# Patient Record
Sex: MALE | Race: WHITE | URBAN - METROPOLITAN AREA
[De-identification: names, ages, dates, MRNs, and addresses within clinical notes are randomized per-mention and may not be internally consistent; named-entity substitution may affect disease eponyms.]

---

## 2017-09-27 ENCOUNTER — HOSPITAL ENCOUNTER (OUTPATIENT)
Dept: CT IMAGING | Age: 62
Discharge: HOME | End: 2017-09-27
Attending: UROLOGY | Admitting: UROLOGY

## 2017-09-27 DIAGNOSIS — N39.0 URINARY TRACT INFECTION: ICD-10-CM

## 2017-09-27 DIAGNOSIS — N39.0 URINARY TRACT INFECTION, SITE NOT SPECIFIED: ICD-10-CM

## 2017-09-27 LAB
GFR AFRICAN AMERICAN: >60
GFR NON-AFRICAN AMERICAN: >60
PERFORMED ON: NORMAL
POC CREATININE: 0.9 MG/DL (ref 0.8–1.3)
POC SAMPLE TYPE: NORMAL

## 2022-11-29 ENCOUNTER — OFFICE VISIT (OUTPATIENT)
Dept: ORTHOPEDIC SURGERY | Age: 67
End: 2022-11-29

## 2022-11-29 VITALS — WEIGHT: 307 LBS | HEIGHT: 66 IN | BODY MASS INDEX: 49.34 KG/M2

## 2022-11-29 DIAGNOSIS — M16.12 PRIMARY OSTEOARTHRITIS OF LEFT HIP: Primary | ICD-10-CM

## 2022-11-29 DIAGNOSIS — M25.552 HIP PAIN, LEFT: ICD-10-CM

## 2022-11-29 RX ORDER — TIZANIDINE 4 MG/1
4 TABLET ORAL NIGHTLY PRN
COMMUNITY
Start: 2022-09-21

## 2022-11-29 RX ORDER — ATORVASTATIN CALCIUM 20 MG/1
TABLET, FILM COATED ORAL
COMMUNITY
Start: 2022-10-01

## 2022-11-29 RX ORDER — LISINOPRIL AND HYDROCHLOROTHIAZIDE 20; 12.5 MG/1; MG/1
TABLET ORAL
COMMUNITY
Start: 2022-10-01

## 2022-11-29 NOTE — PROGRESS NOTES
Frankey Moores  [id number]  November 29, 2022    Chief Complaint   Patient presents with    Hip Pain     Left       History: The patient is a 70-year-old gentleman who is here for evaluation of his left hip. He has had left hip pain and stiffness for approximately 1 year. He denies any specific injury. He does have significant pain at nighttime. He has difficulty getting up from a seated position. He feels that the left hip issues are affecting his back as well. He tried to golf this past fall, but was unable to golf due to the pain. He does have hypertension. He has tried ibuprofen without much relief. This is a consult from Poncho Palma MD for left hip pain. The patient's  past medical history, medications, allergies,  family history, social history, and have been reviewed, and dated and are recorded in the chart. Pertinent items are noted in HPI. Review of systems reviewed from Pertinent History Form dated on 11/29 and available in the patient's chart under the Media tab. Vitals:  Ht 5' 6\" (1.676 m)   Wt (!) 307 lb (139.3 kg)   BMI 49.55 kg/m²     Physical: Physical: Mr. Frankey Moores appears well, he is in no apparent distress, he demonstrates appropriate mood & affect. He is alert and oriented to person, place and time. He has  severe pain with internal rotation of the left hip. Range of motion of the left hip is : 40 degrees abduction, 30 degrees adduction, 35 degrees of external rotation and -5 degrees of internal rotation. Range of motion of the opposite hip is full. He is non tender laterally about the hips. Trendelenburg test is negative bilaterally. Christophe Yumiko test is negative bilaterally. He is non tender about the Sacroiliac joint bilaterally. Leg length discrepancy: none. Examination of the skin reveals no rashes, ulcerations, or lesions, bilaterally in the lower extremities. Sensation to both lower extremities is grossly intact.   Exam of both feet reveals pedal pulses intact and brisk cap refill. Patient is able to dorsiflex and wiggle all toes. Deep tendon reflexes of the lower extremities are normal and symmetric. He is non tender to palpation of the lumbar spine. X-rays: AP pelvis and 2 views of the left hip were obtained and demonstrate severe osteoarthritis of the left hip. There are periarticular osteophytes and complete loss of the joint space. There is also rather severe osteoarthritis of the right hip. Impression: left Hip Osteoarthritis     Plan: At this time, the patient will be scheduled for a left total hip arthroplasty. All risks including but not limited to blood loss, infection, persistent pain,stiffness, dislocation, weakness,loosening, leg length discrepancy, deep vein thrombosis,neurovascular injury, and the risks of anesthesia were discussed. The patient understands all risks and benefits of the procedure and agrees to proceed. The patient will see his primary care Pee Yarbrough MD, for medical clearance. If the patient is on NSAIDS or blood thinners these medications will need to be discontinued one week prior to surgery. Will plan on 81mg ASA BID for 35 days post-op. Orders Placed This Encounter   Procedures    XR HIP 2-3 VW W PELVIS LEFT     Rm 22.      Standing Status:   Future     Number of Occurrences:   1     Standing Expiration Date:   12/29/2022     Order Specific Question:   Reason for exam:     Answer:   pain

## 2022-11-29 NOTE — LETTER
[facility]: [phone] F: [phone]  Surgery Scheduling Form:  DEMOGRAPHICS:                                                                                                              .    Patient Name:  Obi Jeff    Patient :  1955   Patient SS#:        Patient Phone:  [phone] (home)      Patient Address:  [address] [place] [address]    Insurance:    Payer/Plan Subscr  Sex Relation Sub. Ins. ID Effective Group Num   1. MEDICARE - Hardeep Joyner 1955 Male Self [id number]                                    [address]    2. [employer] OF Mary Caicedo 1955 Male Self [id number]                                    [address] [employer] [employer] [address]     DIAGNOSIS & PROCEDURE:                                                                                            .    Diagnosis:  Osteoarthritis- left hip M16.12    Operation:  Total hip replacement- left hip 83767    Location:  [facility]    Surgeon:  El Faith    SCHEDULING INFORMATION:                                                                                         .    Requested Date:  23 OR Time: 7:30 am  Patient Arrival Time: 6:00 am     OR Time Required:  105  Minutes     1 hour 45 minutes    Anesthesia:  General    SA Required:  Yes x 2    Equipment:  Depuy Advanced    Status:  Same day admit     PAT Required:  Yes  COVID19 test:  N/A    Latex Allergy:  no Defibrilator or Pacemaker:  no    Isolation Precautions:  no                      Jitendra Stevenson MD     22   BILLING INFORMATION:                                                                                                    .                          CPT Code Modifier  Total hip  Prior auth: pending   Name: Obi Jeff  : 1955  Procedure: Total hip replacement- left         Date of Surgery:23              Date of JET:23    Allergies: Patient has no known allergies.     Ht Readings from Last 1 Encounters:   11/29/22 5' 6\" (1.676 m)      Wt Readings from Last 1 Encounters:   11/29/22 (!) 307 lb (139.3 kg)       TOTAL HIP REPLACEMENT PHYSICIANS ORDERS   I hereby authorize the pharmacy, under the formulary system to dispense a different brand of drug identical composition and comparable quality unless the brand name I have prescribed is circled on this order sheet  All orders without checkboxes will be processed automatically unless crossed out. Orders with checkboxes MUST be checked in order to be carried out. PRE-OP Katerina.Carrier  [ ] X-ray operative site-standing view  Cachorro.Ander ] CBC without differential [X] Albumin  Cachorro.Ander ] BMP      [ ] Coag profile  [X] PT/INR   [ ] Sed rate on revisions of total joints only Cachorro.Ander ] EKG   Cachorro.Ander ] Type and screen Cachorro.Ander ] UAR       [X] A1C  Cachorro.Ander ] Clean catch urine for routine analysis, if positive for nitrites and/or leukocytes, do urine for C & S  Cachorro.Ander ] Nasal culture for MRSA  Cachorro.Ander ] Physical therapy evaluation and teaching  Cachorro.Ander ] Occupational therapy evaluation and teaching  Cachorro.Ander ] Inform patient to stop all anti-inflammatory medications including aspirin for 7 days before surgery  [X] 1 gram Tranexamic acid IV    DAY OF SURGERY  Cachorro.Ander ] Cefazolin 2 gram IVPB; if patient weighs > 80 kg and serum creatinine <2.5 mg/dl give 2 gram dose within 1 hour of incision. If patient has a minor allergy to Penicillin, still give this. OR  If the pre-op nasal culture for MRSA was positive, repeat nasal swab before surgery and give: Vancomycin 1 gram IVPB, reduce dose of Vancomycin to 500 mg IVPB if PT < 55 kg or serum creatinine > 2 mg/dl (Vancomycin must be administered over 1 hour)& Cefazolin 1 gram IVPB; if patient weighs>80 kg and serum creatinine <2.3 mg/dl give 2 gram dose within 1 hour of incision  OR  If patient has a true severe allergy and underwent allergy testing to Penicillin or Cephalosporins give: Clindamycin 900mg IVPB, then administer 2 more doses post op.                Other order:  Mobic 15mg pre-op       Tammy ] Type and screen    Other order: APPLY KNEE HIGH ANTI-EMBOLIC AND PNEUMO-BOOTS TO UNOPERATIVE LEG    Other order: Incentive spirometry nursing: initiate in preoperative area to obtain patient baseline    T.O: _____________________________/___________________Date:_______Time:_______   Physician    RN  Physician Signature:               Date/Time:  11/30/2022 2:10 PM

## 2022-11-30 ENCOUNTER — PREP FOR PROCEDURE (OUTPATIENT)
Dept: ORTHOPEDIC SURGERY | Age: 67
End: 2022-11-30

## 2022-11-30 ENCOUNTER — TELEPHONE (OUTPATIENT)
Dept: ORTHOPEDIC SURGERY | Age: 67
End: 2022-11-30

## 2022-11-30 DIAGNOSIS — Z01.818 PREOP TESTING: Primary | ICD-10-CM

## 2022-11-30 NOTE — PROGRESS NOTES
RAPT  RISK ASSESSMENT and PREDICTION TOOL    Name: Evette Herndon  YOB: 1955  Surgeon: Carmen Sibley MD         Value Score    1). What is your age group? 50 - 65 years  = 2      66 - 75 years = 1     > 75 years = 0       Your score = 1   2). Gender? Male = 2     Female = 1       Your score = 2   3). How far on average can you walk? Two blocks or more (+/- rest) = 2    (a block is 200 jmhipu=605 ft)  1 - 2 blocks (+/- rest) = 1     Housebound (most of time) = 0       Your score = 1   4). Which gait aid do you use? None = 2    (more often than not) Single-point stick = 1     Crutches/walker = 0       Your score = 2   5). Do you use community supports? None or one per week = 1    (home help, meals on wheels, district nursing) Two or more per week = 0       Your score = 1   6). Will you live with someone who can care for you after your operation? yes = 3     no = 0       Your score = 3    Your Total Score (out of 12) = 10       Key: Destination at discharge from acute care predicted by score.   Score < 6  = extended inpatient rehabilitation  Score 6 - 9  = additional intervention to discharge directly home (Rehab in the home)  Score > 9  = directly home      Patient's Preference Prediction Score Agreed destination   open 10 home   Evette Herndon  Date: 11/30/22

## 2022-11-30 NOTE — TELEPHONE ENCOUNTER
I left a message to inform the patient I need to reschedule his JET class.  I need him to attend JET class on Monday 1/23/23 arriving at 11:45 am at [facility].

## 2022-12-15 ENCOUNTER — TELEPHONE (OUTPATIENT)
Dept: ORTHOPEDIC SURGERY | Age: 67
End: 2022-12-15

## 2023-01-13 ENCOUNTER — TELEPHONE (OUTPATIENT)
Dept: ORTHOPEDIC SURGERY | Age: 68
End: 2023-01-13

## 2023-01-17 NOTE — FLOWSHEET NOTE
DOS 23   [birth year]      JET program: pt. Scheduled 23. Please make sure he goes to this and there are NO critical labs. If POSITIVE for MRSA please put in Vanco order. H&P:  Pt. Is going to make appt. To see Carleen Taylor. He was going to see her sometime the week of . She is a [facility]. [phone]. Please make sure that he went to this and she has MEDICAL clearance. Update:23:  All jet labs completed as ordered. Mrsa  negative, no critical labs noted;   Patient has appointment with Alissa Almeida on 23 at 2:15pm for pre-op h&p    Update:23:  Pre-op h&p in epic-moderate risk-ok to proceed with surgery-see h&p

## 2023-01-17 NOTE — FLOWSHEET NOTE
Preoperative Screening for Elective Surgery/Invasive Procedures While COVID-19 present in the community     Have you tested positive or have been told to self-isolate for COVID-19 like symptoms within the past 28 days? Do you currently have any of the following symptoms? Fever >100.0 F or 99.9 F in immunocompromised patients? New onset cough, shortness of breath or difficulty breathing? New onset sore throat, myalgia (muscle aches and pains), headache, loss of taste/smell or diarrhea? Have you had a potential exposure to COVID-19 within the past 14 days by:  Close contact with a confirmed case? Close contact with a healthcare worker,  or essential infrastructure worker (grocery store, [employer], gas station, public utilities or transportation)? Do you reside in a congregate setting such as; skilled nursing facility, adult home, correctional facility, homeless shelter or other institutional setting? Have you had recent travel to a known COVID-19 hotspot? No to all above       * Admitted with diarrhea? [] YES    [x]  NO     *Prior history of C-Diff. In last 3 months? [] YES    [x]  NO     *Antibiotic use in the past 6-8 weeks? [x]  NO    []  YES      If yes, which: REASON_________________     *Prior hospitalization or nursing home in the last month? []  YES    [x]  NO     SAFETY FIRST. .call before you fall    [facility] time___1/30/23 0600_________        Surgery time__0730__________    Do not eat or drink anything after 12:00 midnight prior to your surgery. This includes water chewing gum, mints and ice chips- the Day of Surgery. You may brush your teeth and gargle the morning of your surgery, but do not swallow the water     Please see your family doctor/pediatrician for a history and physical and/or questions concerning medications. Bring any test results/reports from your physicians office.    If you are under the care of a heart doctor or specialist doctor, please be aware that you may be asked to them for clearance    You may be asked to stop blood thinners such as Coumadin, Plavix, Fragmin, Lovenox, etc., or any anti-inflammatories such as:  Aspirin, Ibuprofen, Advil, Naproxen prior to your surgery.    We also ask that you stop any OTC medications such as fish oil, vitamin E, glucosamine, garlic, Multivitamins, COQ 10, etc.    We ask that you do not smoke 24 hours prior to surgery  We ask that you do not  drink any alcoholic beverages 24 hours prior to surgery     You must make arrangements for a responsible adult to take you home after your surgery.    For your safety you will not be allowed to leave alone or drive yourself home.  Your surgery will be cancelled if you do not have a ride home.     Also for your safety, it is strongly suggested that someone stay with you the first 24 hours after your surgery.     A parent or legal guardian must accompany a child scheduled for surgery and plan to stay at the hospital until the child is discharged.    Please do not bring other children with you.    For your comfort, please wear simple loose fitting clothing to the hospital.  Please do not bring valuables. Do not wear any make-up or nail polish on your fingers or toes.  For your safety, please do not wear any jewelry or body piercing's on the day of surgery. All jewelry must be removed.     If you have dentures, they will be removed before going to operating room.    For your convenience, we will provide you with a container.    If you wear contact lenses or glasses, they will be removed, please bring a case for them.     If you have a living will and a durable power of  for healthcare, please bring in a copy.     As part of our patient safety program to minimize surgical site infections, we ask you to do the following:    Please notify your surgeon if you develop any illness between         now and the day  of your surgery. This includes a cough, cold, fever, sore throat, nausea,         or vomiting, and diarrhea, etc.   Please notify your surgeon if you experience dizziness, shortness         of breath or blurred vision between now and the time of your surgery. Do not shave your operative site 96 hours prior to surgery. For face and neck surgery, men may use an electric razor 48 hours   prior to surgery. You may shower the night before surgery or the morning of   your surgery with an antibacterial soap. You will need to bring a photo ID and insurance card     If you use a C-pap or Bi-pap machine, please bring your machine with you to the hospital     Our goal is to provide you with excellent care, therefore, visitors will be limited to so that we may focus on providing this care for you. Please contact your surgeon office, if you have any further questions. [facility] phone number:  [phone] [facility] fax number:  [phone]    Please note these are generalized instructions for all surgical cases, you may be provided with more specific instructions according to your surgery.

## 2023-01-23 ENCOUNTER — HOSPITAL ENCOUNTER (OUTPATIENT)
Dept: PREADMISSION TESTING | Age: 68
Discharge: HOME | End: 2023-01-27
Payer: MEDICARE

## 2023-01-23 DIAGNOSIS — Z01.818 PREOP TESTING: ICD-10-CM

## 2023-01-23 LAB
ABO/RH: NORMAL
ALBUMIN SERPL-MCNC: 4.4 G/DL (ref 3.4–5)
ANION GAP SERPL CALCULATED.3IONS-SCNC: 12 MMOL/L (ref 3–16)
ANTIBODY SCREEN: NORMAL
BASOPHILS ABSOLUTE: 0.1 K/UL (ref 0–0.2)
BASOPHILS RELATIVE PERCENT: 0.7 %
BILIRUBIN URINE: NEGATIVE
BLOOD, URINE: NEGATIVE
BUN BLDV-MCNC: 16 MG/DL (ref 7–20)
CALCIUM SERPL-MCNC: 10.1 MG/DL (ref 8.3–10.6)
CHLORIDE BLD-SCNC: 103 MMOL/L (ref 99–110)
CLARITY: CLEAR
CO2: 28 MMOL/L (ref 21–32)
COLOR: YELLOW
CREAT SERPL-MCNC: 0.7 MG/DL (ref 0.8–1.3)
EKG ATRIAL RATE: 83 BPM
EKG DIAGNOSIS: NORMAL
EKG P AXIS: 30 DEGREES
EKG P-R INTERVAL: 188 MS
EKG Q-T INTERVAL: 382 MS
EKG QRS DURATION: 114 MS
EKG QTC CALCULATION (BAZETT): 448 MS
EKG R AXIS: 5 DEGREES
EKG T AXIS: 65 DEGREES
EKG VENTRICULAR RATE: 83 BPM
EOSINOPHILS ABSOLUTE: 0.1 K/UL (ref 0–0.6)
EOSINOPHILS RELATIVE PERCENT: 0.9 %
ESTIMATED AVERAGE GLUCOSE: 111.2 MG/DL
GFR SERPL CREATININE-BSD FRML MDRD: >60 ML/MIN/{1.73_M2}
GLUCOSE BLD-MCNC: 94 MG/DL (ref 70–99)
GLUCOSE URINE: NEGATIVE MG/DL
HBA1C MFR BLD: 5.5 %
HCT VFR BLD CALC: 43.5 % (ref 40.5–52.5)
HEMOGLOBIN: 14.4 G/DL (ref 13.5–17.5)
INR BLD: 1.05 (ref 0.87–1.14)
KETONES, URINE: NEGATIVE MG/DL
LEUKOCYTE ESTERASE, URINE: NEGATIVE
LYMPHOCYTES ABSOLUTE: 1.6 K/UL (ref 1–5.1)
LYMPHOCYTES RELATIVE PERCENT: 18.2 %
MCH RBC QN AUTO: 29.1 PG (ref 26–34)
MCHC RBC AUTO-ENTMCNC: 33.1 G/DL (ref 31–36)
MCV RBC AUTO: 87.9 FL (ref 80–100)
MICROSCOPIC EXAMINATION: NORMAL
MONOCYTES ABSOLUTE: 0.6 K/UL (ref 0–1.3)
MONOCYTES RELATIVE PERCENT: 7 %
MRSA SCREEN RT-PCR: NORMAL
NEUTROPHILS ABSOLUTE: 6.6 K/UL (ref 1.7–7.7)
NEUTROPHILS RELATIVE PERCENT: 73.2 %
NITRITE, URINE: NEGATIVE
PDW BLD-RTO: 15.5 % (ref 12.4–15.4)
PH UA: 6.5 (ref 5–8)
PLATELET # BLD: 270 K/UL (ref 135–450)
PMV BLD AUTO: 7.9 FL (ref 5–10.5)
POTASSIUM SERPL-SCNC: 4.2 MMOL/L (ref 3.5–5.1)
PROTEIN UA: NEGATIVE MG/DL
PROTHROMBIN TIME: 13.6 SEC (ref 11.7–14.5)
RBC # BLD: 4.95 M/UL (ref 4.2–5.9)
SODIUM BLD-SCNC: 143 MMOL/L (ref 136–145)
SPECIFIC GRAVITY UA: 1.01 (ref 1–1.03)
URINE REFLEX TO CULTURE: NORMAL
URINE TYPE: NORMAL
UROBILINOGEN, URINE: 0.2 E.U./DL
WBC # BLD: 9.1 K/UL (ref 4–11)

## 2023-01-23 PROCEDURE — 93005 ELECTROCARDIOGRAM TRACING: CPT

## 2023-01-23 PROCEDURE — 85025 COMPLETE CBC W/AUTO DIFF WBC: CPT

## 2023-01-23 PROCEDURE — 86850 RBC ANTIBODY SCREEN: CPT

## 2023-01-23 PROCEDURE — 82040 ASSAY OF SERUM ALBUMIN: CPT

## 2023-01-23 PROCEDURE — 85610 PROTHROMBIN TIME: CPT

## 2023-01-23 PROCEDURE — 86901 BLOOD TYPING SEROLOGIC RH(D): CPT

## 2023-01-23 PROCEDURE — 81003 URINALYSIS AUTO W/O SCOPE: CPT

## 2023-01-23 PROCEDURE — 86900 BLOOD TYPING SEROLOGIC ABO: CPT

## 2023-01-23 PROCEDURE — 83036 HEMOGLOBIN GLYCOSYLATED A1C: CPT

## 2023-01-23 PROCEDURE — 87641 MR-STAPH DNA AMP PROBE: CPT

## 2023-01-23 PROCEDURE — 80048 BASIC METABOLIC PNL TOTAL CA: CPT

## 2023-01-23 NOTE — PROGRESS NOTES
Patient attended JET class on 1/23/2023 . Patient verified surgery for Total Hip replacement. Patient received patient information and educational JET folder including the following handouts: jet powerpoint, covid-19 restrictions, ERAS, incentive spirometry including purpose and how to perform, case management contact information, hand hygiene, preventing constipation, home health care agency list, skilled nursing facility list, pre-operative showering techniques and the use of anti-septic 3 days before surgery. Interviews completed by PT, OT, and Nurse Navigator. Labs and Tests completed as ordered/necessary. Anti-septic bottle given to patient to take home. Patient states no further questions or concerns. Patient provided orthopedic office, case management, and nurse navigator contact information. Date Of Surgery: 1/30/2023  Surgeon: Dr Nina Lozada  Per Patient Will see/Saw Primary care provider on 1/25/2023. HISTORY OF JOINT REPLACEMENT(S): No history of joint replacement    DC Plan: Home    HOME ASSISTANCE - WHO WILL BE STAYING WITH YOU AT HOME FOR FIRST SEVERAL DAYS? friend Dhaval Morning: friend Rivka Irvin or other    STEPS INTO HOME: 3    STEPS TO BATHROOM/BEDROOM: n/a, Able to live on the main level. DME NEEDS:  Needs a rolling walker, agreeable to using [employer]. LENGTH OF STAY HAS BEEN DISCUSSED WITH THE PATIENT, APPROPRIATE TO HIS/ HER PROCEDURE. PATIENT HAS BEEN INFORMED THAT THEY WILL BE DISCHARGED WHEN THE PHYSICIAN DEEMS THEM MEDICALLY READY. MOST PATIENTS CAN EXPECT TO BE IN THE HOSPITAL ONE NIGHT AS AN OBSERVATION ONLY, OR 1-2 DAYS AS AN ADMISSION FOR THOSE WITH MEDICAL HEALTH ISSUES/COMPLICATIONS. CHOICES FOR HHC, DME VENDORS AND SKILLED/ REHAB FACILITIES PROVIDED TO PATIENT DURING THIS INTERVIEW. HOME CARE CHOICE(S): open to any agency, home health care list was provided to patient. .    SNF/REHAB CHOICES (S):     Declined a need for skilled nursing facility.     MEDS TO BEDS FROM [facility]: Prefers to use their [facility] pharmacy, location [address] [place] [address]. The Patient and/or patient representative was provided with a choice of provider and agrees   with the discharge plan. [x] Yes [] No    Freedom of choice list was provided with basic dialogue that supports the patient's individualized plan of care/goals, treatment preferences and shares the quality data associated with the providers. [x] Yes [] No    Facesheet with discharge needs provided to [facility] / .   Electronically signed by Brandon Jarvis RN on 1/23/2023 at 3:33 PM

## 2023-01-26 NOTE — DISCHARGE INSTRUCTIONS
If you use a bi-pap/cpap for sleep apnea, please wear when sleeping while taking narcotics. Do not drink alcohol while taking your blood thinner or narcotic pain medication. Do not take any sleep aids while on narcotics. Wear cira hoses (compression stockings) for 2 weeks and to only remove when showering or at bedtime. Please wear Teds to follow up appointment with orthopedic surgeon. Use your Incentive Spirometer 4 times a day for 1 week after surgery to prevent pneumonia. Use ice packs as needed for swelling and pain. DO NOT apply directly to your skin. Use a clean towel or pillow case as a barrier between your skin and the ice pack. Pain pills and activity changes may lead to constipation. You may need to use a laxative such as Dulcolax, Senokot, Miralax, or Milk of Magnesia. If you do not have a bowel movement daily then we recommend to take a laxative that same evening. If you stop passing gas or are unable to have a bowel movement, please notify your surgeon for further instructions. Do not go any longer than 2 days without having a bowel movement without notification to your surgeon. When to clean your hands: For patients  In the hospital or in your home, you can come in contact with many harmful germs. To help prevent infection, wash your hands with soap and water often, especially:  Before and After touching or changing a dressing or bandage  After using the bathroom  Before and after eating  After coughing or sneezing  After using a tissue  After touching any object or surface that may be contaminated  After touching an animal during a pet therapy session (hospital)  After touching an animal, cleaning up after a pet, or preparing food for pets (home)    Please contact Nadia Haro or the Orthopedic office with any questions or concerns after your discharge.  If you have any issues or concerns after hours please call the Orthopedic Office to reach the Surgeon on call first at  [phone]. If you need a pain medication refill please contact your surgeon's office. [facility] Orthoped[facility]:    Monday-Friday 8am- 5pm      [address] [facility] Nurse Navigator: Monday-Wednesday 8am- 5pm, Thursday 8am-3pm  [phone]    After Hours Clinic Walk in [facility]  [address] [facility], [address]    Monday-Friday 5pm-9pm  &  Saturday 9am-1pm          [phone]      If you have a medical emergency please call 911 or seek immediate medical help. [address]   [phone]    Call 911 anytime you think you may need emergency care. For example, call if:  You passed out (lost consciousness). You have severe trouble breathing. You have sudden chest pain and shortness of breath, or you cough up blood. Call your doctor now or seek immediate medical care if:    Your leg or foot turns cold or changes color. You have symptoms of a blood clot in your leg (called a deep vein thrombosis), such as:  Pain in your calf, back of the knee, thigh, or groin. Redness and swelling in your leg or groin. You have symptoms of infection, such as: Increased pain, swelling, warmth, or redness. Red streaks leading from the wound. Pus draining from the wound. A fever. easy bruising, unusual bleeding (nose, mouth, vagina, or rectum), bleeding from wounds or needle injections, any bleeding that will not stop;  heavy menstrual periods;  urine that looks red, pink, or brown; or  black or bloody stools, coughing up blood or vomit that looks like coffee grounds. [facility] is participating in [address] [place] [address] for Joint Replacement (CJR) [facility] is participating in a Medicare initiative called the [address] [place] [address] for Joint Replacement (CJR) model.  Medicare designed this model to encourage higher quality care and greater financial accountability from hospitals when Medicare beneficiaries receive lower-extremity joint replacement procedures (Randol Slimmer), typically hip or knee replacements. Jose Moses participation in the [facility] model should not restrict your access to care for your medical condition or your freedom to choose your health care providers and services. All existing Medicare beneficiary protections continue to be available to you. The CJR model aims to help give you better care. The CJR model aims to support better and more efficient care for beneficiaries undergoing LEJR procedures. A CJR episode of care is typically defined as an admission of an eligible Medicare beneficiary to a hospital participating in the [address] [place] [address],[address] for an Randol Slimmer procedure. The LEJR procedure can take place in either an inpatient or outpatient setting and will still be considered a CJR episode of care. The CJR episode of care continues for 90 days following discharge. This model uses episode payment and quality measurement for an episode of care associated with LEJR procedures to encourage hospitals, physicians, and post-acute care providers to work together to improve the quality and coordination of care from the initial hospitalization through recovery. Under the [address] [facility],[address], Jose Moses can earn additional payments from Medicare if we meet the high quality goals set by Medicare, while keeping hospital costs and care spending under control. If we dont meet these quality and cost goals, we may have to repay Medicare. Medicare is using the CJR model to encourage Jose Moses to work more closely with your doctors and other health care providers that help patients recover after discharge from the hospital including, but not limited to, nursing homes (skilled nursing facilities), home health agencies, inpatient rehabilitation facilities, and long- term care hospitals.  If you require a stay in a Skilled Nursing Facility (SNF) to assist with your recovery from surgery and if, and only if, it is clinically appropriate, the CJR model permits [facility] to discharge you to a high quality SNF sooner than the three days Medicare usually requires to cover a SNF stay. Medicare will monitor your care to ensure you and others are receiving high quality care. It's your choice which hospital, doctor, or other providers you use. You have the right to choose which hospital, doctor, or other post-hospital stay health care provider you use. If you believe that your care is adversely affected or have concerns about substandard care, you may call 1-800-MEDICARE or contact your states Quality Improvement Organization by going to: TravelAI. To find a different doctor, visit [address] Physician Compare website, [employer].zerved, or call 1-800-MEDICARE (4-378.848.7493). TTY users should call 5-572.734.8826. To find a different hospital, visit [employer], or  call 1-800-MEDICARE (1- 182.885.7334). TTY users should call  2-505.932.5514. To find a different skilled nursing facility, visit Bassem Jacobo  website, [email or web address]/, or call  1-800-MEDICARE (3-682.154.1406). TTY users should call 1-185-466- 3986. To find a different home health agency, visit [address] [place] [address] website, MoveThatBlock.comco.uk, or call 1-800-MEDICARE (1-643.471.1005). TTY users should call 9-600-758- 9563.      For an explanation of how patients can access their health care records and beneficiary claims data, please visit [email or web address]- [email or web address]/blue-button/about-blue-button    Get more information     If you have questions or want more information about the Comprehensive Care for Joint Replacement (CJR) model, call [facility] at [phone] or call 1-800-MEDICARE. You can also find additional information at https://innovation.cms.gov/initiatives/cjr.

## 2023-01-26 NOTE — DISCHARGE INSTR - COC
[facility]) Continuity of Care Form    Patient Name:  Negar Thomas  :     MRN:  [id number]    Admit date:  (Not on file)  Discharge date:  2023    Code Status Order: No Order  Advance Directives: No    Admitting Physician: Sheron Holstein, MD  PCP: Tong Gupta MD    Discharging Nurse: [facility] Unit/Room#: No information available for this encounter. Discharging Unit Phone Number: [phone]    Emergency Contact:        Past Surgical History:  Past Surgical History:   Procedure Laterality Date    HERNIA REPAIR      x2       Immunization History:   Immunization History   Administered Date(s) Administered    COVID-19, PFIZER PURPLE top, DILUTE for use, (age 15 y+), 30mcg/0.3mL 2021, 10/07/2021       Active Problems:  Active Problems:    * No active hospital problems. *  Resolved Problems:    * No resolved hospital problems. *      Isolation/Infection:       Nurse Assessment:  Last Vital Signs:Ht 5' 6\" (1.676 m)   Wt (!) 307 lb (139.3 kg)   BMI 49.55 kg/m²   Last documented pain score (0-10 scale):    Last Weight:   Wt Readings from Last 1 Encounters:   22 (!) 307 lb (139.3 kg)     Mental Status:  oriented and alert     IV Access:  - None    Nursing Mobility/ADLs:  Walking   Assisted  Transfer  Assisted  Bathing  Assisted  Dressing  Assisted  Toileting  [facility] Delivery   whole    Wound Care Documentation and Therapy:  Keep glued Prineo dressing clean and intact. DO NOT remove. This is waterproof for showering. Please do not use lotion on dressing. The separate lower tan dressing can be removed in 2 days and no dressing is needed on the small wound. Removal of Prineo dressing will be discussed at postop visit in 2 weeks at office. Elimination:  Urinary Catheter: None   Colostomy/Ileostomy: No  Continence:    Bowel: Yes  Bladder: Yes  Date of Last BM: 2023    Intake/Output Summary (Last 24 hours)   No intake or output data in the 24 hours ending 01/26/23 1535  Safety Concerns: At Risk for Falls    Impairments/Disabilities:      None    Nutrition Therapy:  Current Nutrition Therapy: general  Routes of Feeding: Oral  Liquids: No Restrictions  Daily Fluid Restriction: no  Last Modified Barium Swallow with Video (Video Swallowing Test): not done    Treatments at the Time of Hospital Discharge:   Respiratory Treatments:   Oxygen Therapy:  is not on home oxygen therapy. Ventilator:    - CPAP   only when sleeping and device from home    Lab orders for discharge:        Rehab Therapies: Physical Therapy, Occupational Therapy . See pt 4-5 times a week for the first week then 3 times a week thereafter. Weight Bearing Status/Restrictions: No weight bearing restirctions  Other Medical Equipment (for information only, NOT a DME order):  Rolling walker  Other Treatments: Anticoagulation medications must be taken as ordered, Bilateral knee high JANET hose for 2 weeks after surgery. Please review application of JANET hose with pt.    HOME HEALTH CARE: LEVEL 3 SAFETY     Home health agency to establish plan of care for patient over 60 day period   Nursing  Initial home SN evaluation visit to occur within 24-48 hours for:  1)  medication management  2)  VS and clinical assessment  3)  S&S chronic disease exacerbation education + when to contact MD/NP  4)  care coordination  Medication Reconciliation during 1st SN visit  PT/OT/Speech   Evaluations in home within 24-48 hours of discharge to include DME and home safety   Frontload therapy 5 days, then 3x a week   OT to evaluate if patient has [address] needs for personal care    evaluation within 24-48 hours to evaluate resources & insurance for potential [place], [place], LTC, and Medicaid options   Palliative Care referral within 5 days of hospital discharge   PCP Visit scheduled within 3 - 7 days of hospital discharge    [address] (If patient is agreeable and meets guidelines)      Patient's personal belongings (please select all that are sent with patient):  Cpap    RN SIGNATURE:  Electronically signed by Carlie Dimas RN on 1/30/23 at 5:08 PM EST    PHYSICIAN SECTION    Prognosis: Good    Condition at Discharge: Stable    Rehab Potential (if transferring to Rehab): Good    Physician Certification: I certify the above orders, information, and transfer of Eulalia Riddle is necessary for the continuing treatment of the diagnosis listed and that he requires [address] for less 30 days. Update Admission H&P: No change in H&P    PHYSICIAN SIGNATURE:  Electronically signed by JAIDEN Laurent CNP on 1/26/23 at 3:36 PM EST/ Dr Briones Hunger Status Date: 1/30/23     [facility]:    Discharging to Facility/ Agency   Name:  [facility] care    Address: [address] [place] [address], [address] [place] [address]  Phone: [phone]  Fax: [phone]     / signature: Electronically signed by Claudia Ryan on 1/30/23 at 4:03 PM EST            DISCHARGE INSTRUCTIONS FOR TOTAL JOINT REPLACEMENT  Activity:  Elevate your leg if swelling occurs in your ankle. Use elastic wraps/hose until swelling decreases. Continue the exercise program as prescribed by physical therapists. Take frequent walks. Use walker, crutches, or cane with weight bearing instructions as indicated by the physical therapists. Take rest periods often. Elevate leg during rest period. Hip Replacement Only: Follow these instructions for a minimum of 3 months or until instructed by Dr Ayaz Gutierrez. Avoid sitting in low chairs or toilets without raised seats. Keep knees apart. Sleep with a pillow between your legs. Do not cross your legs, especially when putting on shoes and socks. WOUND CARE:Do not scrub wound. Pat it dry with a soft towel.   Dont apply any lotions or creams to your wound. Check the incision every day for redness, swelling, or increase in drainage. Diet:  You can resume your normal diet. There are no limits on your diet due to your surgery. Pain pills and activity changes may lead to constipation. To prevent this, use prune juice or bran cereals liberally. You may need to use a laxative such as Dulcolax, Senokot, or Milk of Magnesia. Drink plenty of fluids. Medications: Take pain pills as ordered to maintain comfort. Never drive while taking pain medicine. Avoid over the counter medications until checking with your doctor. Resume previous medications as instructed by your doctor. Take blood thinners as directed and until completed. Call Your Doctor If:  You have increased pain not controlled by medications. Excessive swelling in your ankle. You develop numbness, tingling, or decreased movement. You have a fever greater than 100 degrees for a day or over 101 degrees at any one time. Your wound becomes more reddened, starts draining, or opens. If you fall. You have any questions about your recovery. Inform your family doctor/dentist or any other doctor who cares for you in the future that you have a joint replacement. They may want to order antibiotics for dental or surgical procedures. If you have required the use of insulin to control your blood sugar after surgery, follow up with your family doctor. Call your surgeons office to schedule your appointment to be seen 2 to 3 weeks after surgery. Make your appointment to continue physical therapy per doctors orders.   Smoking cessation assistance can be obtained from your family doctor or by calling [place] @ [phone]    _______________________________   _____   _______________________  ____                Patient Signature              Date      Witness                               Date

## 2023-01-27 ENCOUNTER — ANESTHESIA EVENT (OUTPATIENT)
Dept: OPERATING ROOM | Age: 68
DRG: 470 | End: 2023-01-27
Payer: MEDICARE

## 2023-01-30 ENCOUNTER — APPOINTMENT (OUTPATIENT)
Dept: GENERAL RADIOLOGY | Age: 68
DRG: 470 | End: 2023-01-30
Attending: ORTHOPAEDIC SURGERY
Payer: MEDICARE

## 2023-01-30 ENCOUNTER — HOSPITAL ENCOUNTER (INPATIENT)
Age: 68
LOS: 1 days | Discharge: HOME HEALTH | DRG: 470 | End: 2023-01-31
Attending: ORTHOPAEDIC SURGERY | Admitting: ORTHOPAEDIC SURGERY
Payer: MEDICARE

## 2023-01-30 ENCOUNTER — ANESTHESIA (OUTPATIENT)
Dept: OPERATING ROOM | Age: 68
DRG: 470 | End: 2023-01-30
Payer: MEDICARE

## 2023-01-30 DIAGNOSIS — M16.12 PRIMARY OSTEOARTHRITIS OF LEFT HIP: Primary | ICD-10-CM

## 2023-01-30 LAB
ABO/RH: NORMAL
ANTIBODY SCREEN: NORMAL
GLUCOSE BLD-MCNC: 152 MG/DL (ref 70–99)
GLUCOSE BLD-MCNC: 154 MG/DL (ref 70–99)
GLUCOSE BLD-MCNC: 172 MG/DL (ref 70–99)
PERFORMED ON: ABNORMAL

## 2023-01-30 PROCEDURE — 97116 GAIT TRAINING THERAPY: CPT

## 2023-01-30 PROCEDURE — 3700000000 HC ANESTHESIA ATTENDED CARE: Performed by: ORTHOPAEDIC SURGERY

## 2023-01-30 PROCEDURE — 0SRB0JA REPLACEMENT OF LEFT HIP JOINT WITH SYNTHETIC SUBSTITUTE, UNCEMENTED, OPEN APPROACH: ICD-10-PCS | Performed by: ORTHOPAEDIC SURGERY

## 2023-01-30 PROCEDURE — 2500000003 HC RX 250 WO HCPCS

## 2023-01-30 PROCEDURE — A4217 STERILE WATER/SALINE, 500 ML: HCPCS | Performed by: ORTHOPAEDIC SURGERY

## 2023-01-30 PROCEDURE — 2580000003 HC RX 258: Performed by: ANESTHESIOLOGY

## 2023-01-30 PROCEDURE — 97535 SELF CARE MNGMENT TRAINING: CPT

## 2023-01-30 PROCEDURE — C1776 JOINT DEVICE (IMPLANTABLE): HCPCS | Performed by: ORTHOPAEDIC SURGERY

## 2023-01-30 PROCEDURE — 7100000000 HC PACU RECOVERY - FIRST 15 MIN: Performed by: ORTHOPAEDIC SURGERY

## 2023-01-30 PROCEDURE — 6370000000 HC RX 637 (ALT 250 FOR IP): Performed by: ORTHOPAEDIC SURGERY

## 2023-01-30 PROCEDURE — 97530 THERAPEUTIC ACTIVITIES: CPT

## 2023-01-30 PROCEDURE — 7100000001 HC PACU RECOVERY - ADDTL 15 MIN: Performed by: ORTHOPAEDIC SURGERY

## 2023-01-30 PROCEDURE — 3700000001 HC ADD 15 MINUTES (ANESTHESIA): Performed by: ORTHOPAEDIC SURGERY

## 2023-01-30 PROCEDURE — 6360000002 HC RX W HCPCS: Performed by: ORTHOPAEDIC SURGERY

## 2023-01-30 PROCEDURE — 3600000005 HC SURGERY LEVEL 5 BASE: Performed by: ORTHOPAEDIC SURGERY

## 2023-01-30 PROCEDURE — 6360000002 HC RX W HCPCS

## 2023-01-30 PROCEDURE — 3600000015 HC SURGERY LEVEL 5 ADDTL 15MIN: Performed by: ORTHOPAEDIC SURGERY

## 2023-01-30 PROCEDURE — 27130 TOTAL HIP ARTHROPLASTY: CPT | Performed by: ORTHOPAEDIC SURGERY

## 2023-01-30 PROCEDURE — 2709999900 HC NON-CHARGEABLE SUPPLY: Performed by: ORTHOPAEDIC SURGERY

## 2023-01-30 PROCEDURE — 2500000003 HC RX 250 WO HCPCS: Performed by: ORTHOPAEDIC SURGERY

## 2023-01-30 PROCEDURE — 97166 OT EVAL MOD COMPLEX 45 MIN: CPT

## 2023-01-30 PROCEDURE — 1200000000 HC SEMI PRIVATE

## 2023-01-30 PROCEDURE — 2580000003 HC RX 258: Performed by: ORTHOPAEDIC SURGERY

## 2023-01-30 PROCEDURE — 73501 X-RAY EXAM HIP UNI 1 VIEW: CPT

## 2023-01-30 PROCEDURE — 86850 RBC ANTIBODY SCREEN: CPT

## 2023-01-30 PROCEDURE — 86900 BLOOD TYPING SEROLOGIC ABO: CPT

## 2023-01-30 PROCEDURE — 97162 PT EVAL MOD COMPLEX 30 MIN: CPT

## 2023-01-30 PROCEDURE — 86901 BLOOD TYPING SEROLOGIC RH(D): CPT

## 2023-01-30 DEVICE — LINER ACET OD56MM ID40MM +4MM OFFSET HIP POLYETH MTL ON: Type: IMPLANTABLE DEVICE | Site: HIP | Status: FUNCTIONAL

## 2023-01-30 DEVICE — HEAD FEM DIA40MM +1.5MM OFFSET 12/14 TAPR HIP CERAMIC TI SL: Type: IMPLANTABLE DEVICE | Site: HIP | Status: FUNCTIONAL

## 2023-01-30 DEVICE — STEM FEM SZ 6 L107MM 12/14 TAPR HI OFFSET HIP DUOFIX CLLRD: Type: IMPLANTABLE DEVICE | Site: HIP | Status: FUNCTIONAL

## 2023-01-30 DEVICE — CUP ACET DIA56MM HIP GRIPTION PRI CEMENTLESS FIX SECT SER: Type: IMPLANTABLE DEVICE | Site: HIP | Status: FUNCTIONAL

## 2023-01-30 RX ORDER — SODIUM CHLORIDE 0.9 % (FLUSH) 0.9 %
5-40 SYRINGE (ML) INJECTION PRN
Status: DISCONTINUED | OUTPATIENT
Start: 2023-01-30 | End: 2023-01-30 | Stop reason: HOSPADM

## 2023-01-30 RX ORDER — FAMOTIDINE 20 MG/1
20 TABLET, FILM COATED ORAL 2 TIMES DAILY
Status: DISCONTINUED | OUTPATIENT
Start: 2023-01-30 | End: 2023-01-31 | Stop reason: HOSPADM

## 2023-01-30 RX ORDER — INSULIN LISPRO 100 [IU]/ML
0.08 INJECTION, SOLUTION INTRAVENOUS; SUBCUTANEOUS
Status: DISCONTINUED | OUTPATIENT
Start: 2023-01-30 | End: 2023-01-31 | Stop reason: HOSPADM

## 2023-01-30 RX ORDER — FENTANYL CITRATE 50 UG/ML
25 INJECTION, SOLUTION INTRAMUSCULAR; INTRAVENOUS EVERY 5 MIN PRN
Status: DISCONTINUED | OUTPATIENT
Start: 2023-01-30 | End: 2023-01-30 | Stop reason: HOSPADM

## 2023-01-30 RX ORDER — SODIUM CHLORIDE 9 MG/ML
INJECTION, SOLUTION INTRAVENOUS CONTINUOUS
Status: DISCONTINUED | OUTPATIENT
Start: 2023-01-30 | End: 2023-01-31 | Stop reason: HOSPADM

## 2023-01-30 RX ORDER — DEXTROSE MONOHYDRATE 100 MG/ML
INJECTION, SOLUTION INTRAVENOUS CONTINUOUS PRN
Status: DISCONTINUED | OUTPATIENT
Start: 2023-01-30 | End: 2023-01-31 | Stop reason: HOSPADM

## 2023-01-30 RX ORDER — ONDANSETRON 2 MG/ML
4 INJECTION INTRAMUSCULAR; INTRAVENOUS
Status: DISCONTINUED | OUTPATIENT
Start: 2023-01-30 | End: 2023-01-30 | Stop reason: HOSPADM

## 2023-01-30 RX ORDER — INSULIN LISPRO 100 [IU]/ML
0-4 INJECTION, SOLUTION INTRAVENOUS; SUBCUTANEOUS
Status: DISCONTINUED | OUTPATIENT
Start: 2023-01-30 | End: 2023-01-31 | Stop reason: HOSPADM

## 2023-01-30 RX ORDER — PROMETHAZINE HYDROCHLORIDE 25 MG/1
12.5 TABLET ORAL EVERY 6 HOURS PRN
Status: DISCONTINUED | OUTPATIENT
Start: 2023-01-30 | End: 2023-01-31 | Stop reason: HOSPADM

## 2023-01-30 RX ORDER — ASPIRIN 81 MG/1
81 TABLET ORAL 2 TIMES DAILY
Status: CANCELLED | OUTPATIENT
Start: 2023-01-31

## 2023-01-30 RX ORDER — SODIUM CHLORIDE 9 MG/ML
INJECTION, SOLUTION INTRAVENOUS PRN
Status: DISCONTINUED | OUTPATIENT
Start: 2023-01-30 | End: 2023-01-30 | Stop reason: HOSPADM

## 2023-01-30 RX ORDER — MEPERIDINE HYDROCHLORIDE 25 MG/ML
12.5 INJECTION INTRAMUSCULAR; INTRAVENOUS; SUBCUTANEOUS EVERY 5 MIN PRN
Status: DISCONTINUED | OUTPATIENT
Start: 2023-01-30 | End: 2023-01-30 | Stop reason: HOSPADM

## 2023-01-30 RX ORDER — SODIUM CHLORIDE 0.9 % (FLUSH) 0.9 %
5-40 SYRINGE (ML) INJECTION EVERY 12 HOURS SCHEDULED
Status: DISCONTINUED | OUTPATIENT
Start: 2023-01-30 | End: 2023-01-30 | Stop reason: HOSPADM

## 2023-01-30 RX ORDER — LIDOCAINE HYDROCHLORIDE 20 MG/ML
INJECTION, SOLUTION EPIDURAL; INFILTRATION; INTRACAUDAL; PERINEURAL PRN
Status: DISCONTINUED | OUTPATIENT
Start: 2023-01-30 | End: 2023-01-30 | Stop reason: SDUPTHER

## 2023-01-30 RX ORDER — PROPOFOL 10 MG/ML
INJECTION, EMULSION INTRAVENOUS PRN
Status: DISCONTINUED | OUTPATIENT
Start: 2023-01-30 | End: 2023-01-30 | Stop reason: SDUPTHER

## 2023-01-30 RX ORDER — SODIUM CHLORIDE 0.9 % (FLUSH) 0.9 %
5-40 SYRINGE (ML) INJECTION PRN
Status: DISCONTINUED | OUTPATIENT
Start: 2023-01-30 | End: 2023-01-31 | Stop reason: HOSPADM

## 2023-01-30 RX ORDER — FENTANYL CITRATE 50 UG/ML
INJECTION, SOLUTION INTRAMUSCULAR; INTRAVENOUS PRN
Status: DISCONTINUED | OUTPATIENT
Start: 2023-01-30 | End: 2023-01-30 | Stop reason: SDUPTHER

## 2023-01-30 RX ORDER — OXYCODONE HYDROCHLORIDE 5 MG/1
5-10 TABLET ORAL EVERY 6 HOURS PRN
Qty: 40 TABLET | Refills: 0 | Status: SHIPPED | OUTPATIENT
Start: 2023-01-30 | End: 2023-02-04

## 2023-01-30 RX ORDER — ROCURONIUM BROMIDE 10 MG/ML
INJECTION, SOLUTION INTRAVENOUS PRN
Status: DISCONTINUED | OUTPATIENT
Start: 2023-01-30 | End: 2023-01-30 | Stop reason: SDUPTHER

## 2023-01-30 RX ORDER — METOPROLOL SUCCINATE 25 MG/1
25 TABLET, EXTENDED RELEASE ORAL DAILY
COMMUNITY
Start: 2023-01-25

## 2023-01-30 RX ORDER — MELOXICAM 7.5 MG/1
15 TABLET ORAL ONCE
Status: COMPLETED | OUTPATIENT
Start: 2023-01-30 | End: 2023-01-30

## 2023-01-30 RX ORDER — LISINOPRIL AND HYDROCHLOROTHIAZIDE 20; 12.5 MG/1; MG/1
2 TABLET ORAL DAILY
Status: DISCONTINUED | OUTPATIENT
Start: 2023-01-30 | End: 2023-01-31 | Stop reason: HOSPADM

## 2023-01-30 RX ORDER — INSULIN GLARGINE 100 [IU]/ML
0.25 INJECTION, SOLUTION SUBCUTANEOUS NIGHTLY
Status: DISCONTINUED | OUTPATIENT
Start: 2023-01-30 | End: 2023-01-31 | Stop reason: HOSPADM

## 2023-01-30 RX ORDER — ONDANSETRON 2 MG/ML
INJECTION INTRAMUSCULAR; INTRAVENOUS PRN
Status: DISCONTINUED | OUTPATIENT
Start: 2023-01-30 | End: 2023-01-30 | Stop reason: SDUPTHER

## 2023-01-30 RX ORDER — SUCCINYLCHOLINE/SOD CL,ISO/PF 200MG/10ML
SYRINGE (ML) INTRAVENOUS PRN
Status: DISCONTINUED | OUTPATIENT
Start: 2023-01-30 | End: 2023-01-30 | Stop reason: SDUPTHER

## 2023-01-30 RX ORDER — SODIUM CHLORIDE 9 MG/ML
INJECTION, SOLUTION INTRAVENOUS PRN
Status: DISCONTINUED | OUTPATIENT
Start: 2023-01-30 | End: 2023-01-31 | Stop reason: HOSPADM

## 2023-01-30 RX ORDER — ACETAMINOPHEN 325 MG/1
650 TABLET ORAL EVERY 6 HOURS
Status: DISCONTINUED | OUTPATIENT
Start: 2023-01-30 | End: 2023-01-31 | Stop reason: HOSPADM

## 2023-01-30 RX ORDER — OXYCODONE HYDROCHLORIDE 5 MG/1
5 TABLET ORAL EVERY 4 HOURS PRN
Status: DISCONTINUED | OUTPATIENT
Start: 2023-01-30 | End: 2023-01-31 | Stop reason: HOSPADM

## 2023-01-30 RX ORDER — TIZANIDINE 4 MG/1
4 TABLET ORAL NIGHTLY PRN
Status: DISCONTINUED | OUTPATIENT
Start: 2023-01-30 | End: 2023-01-31 | Stop reason: HOSPADM

## 2023-01-30 RX ORDER — DEXAMETHASONE SODIUM PHOSPHATE 4 MG/ML
INJECTION, SOLUTION INTRA-ARTICULAR; INTRALESIONAL; INTRAMUSCULAR; INTRAVENOUS; SOFT TISSUE PRN
Status: DISCONTINUED | OUTPATIENT
Start: 2023-01-30 | End: 2023-01-30 | Stop reason: SDUPTHER

## 2023-01-30 RX ORDER — METOPROLOL SUCCINATE 25 MG/1
25 TABLET, EXTENDED RELEASE ORAL DAILY
Status: DISCONTINUED | OUTPATIENT
Start: 2023-01-31 | End: 2023-01-31 | Stop reason: HOSPADM

## 2023-01-30 RX ORDER — FENTANYL CITRATE 50 UG/ML
50 INJECTION, SOLUTION INTRAMUSCULAR; INTRAVENOUS EVERY 5 MIN PRN
Status: DISCONTINUED | OUTPATIENT
Start: 2023-01-30 | End: 2023-01-30 | Stop reason: HOSPADM

## 2023-01-30 RX ORDER — ONDANSETRON 2 MG/ML
4 INJECTION INTRAMUSCULAR; INTRAVENOUS EVERY 6 HOURS PRN
Status: DISCONTINUED | OUTPATIENT
Start: 2023-01-30 | End: 2023-01-31 | Stop reason: HOSPADM

## 2023-01-30 RX ORDER — SENNA AND DOCUSATE SODIUM 50; 8.6 MG/1; MG/1
1 TABLET, FILM COATED ORAL 2 TIMES DAILY
Status: DISCONTINUED | OUTPATIENT
Start: 2023-01-30 | End: 2023-01-31 | Stop reason: HOSPADM

## 2023-01-30 RX ORDER — SODIUM CHLORIDE 0.9 % (FLUSH) 0.9 %
5-40 SYRINGE (ML) INJECTION EVERY 12 HOURS SCHEDULED
Status: DISCONTINUED | OUTPATIENT
Start: 2023-01-30 | End: 2023-01-31 | Stop reason: HOSPADM

## 2023-01-30 RX ORDER — OXYCODONE HYDROCHLORIDE 10 MG/1
10 TABLET ORAL EVERY 4 HOURS PRN
Status: DISCONTINUED | OUTPATIENT
Start: 2023-01-30 | End: 2023-01-31 | Stop reason: HOSPADM

## 2023-01-30 RX ORDER — ATORVASTATIN CALCIUM 20 MG/1
20 TABLET, FILM COATED ORAL NIGHTLY
Status: DISCONTINUED | OUTPATIENT
Start: 2023-01-30 | End: 2023-01-31 | Stop reason: HOSPADM

## 2023-01-30 RX ORDER — INSULIN LISPRO 100 [IU]/ML
0-4 INJECTION, SOLUTION INTRAVENOUS; SUBCUTANEOUS NIGHTLY
Status: DISCONTINUED | OUTPATIENT
Start: 2023-01-30 | End: 2023-01-31 | Stop reason: HOSPADM

## 2023-01-30 RX ORDER — MAGNESIUM HYDROXIDE 1200 MG/15ML
LIQUID ORAL CONTINUOUS PRN
Status: COMPLETED | OUTPATIENT
Start: 2023-01-30 | End: 2023-01-30

## 2023-01-30 RX ORDER — HYDROXYZINE HYDROCHLORIDE 10 MG/1
10 TABLET, FILM COATED ORAL EVERY 8 HOURS PRN
Status: DISCONTINUED | OUTPATIENT
Start: 2023-01-30 | End: 2023-01-31 | Stop reason: HOSPADM

## 2023-01-30 RX ADMIN — SENNOSIDES AND DOCUSATE SODIUM 1 TABLET: 50; 8.6 TABLET ORAL at 20:24

## 2023-01-30 RX ADMIN — ONDANSETRON 4 MG: 2 INJECTION INTRAMUSCULAR; INTRAVENOUS at 07:51

## 2023-01-30 RX ADMIN — PROPOFOL 90 MG: 10 INJECTION, EMULSION INTRAVENOUS at 08:13

## 2023-01-30 RX ADMIN — FENTANYL CITRATE 50 MCG: 50 INJECTION INTRAMUSCULAR; INTRAVENOUS at 07:34

## 2023-01-30 RX ADMIN — INSULIN LISPRO 11 UNITS: 100 INJECTION, SOLUTION INTRAVENOUS; SUBCUTANEOUS at 17:44

## 2023-01-30 RX ADMIN — FAMOTIDINE 20 MG: 20 TABLET, FILM COATED ORAL at 20:24

## 2023-01-30 RX ADMIN — PROPOFOL 50 MG: 10 INJECTION, EMULSION INTRAVENOUS at 08:40

## 2023-01-30 RX ADMIN — PROPOFOL 50 MG: 10 INJECTION, EMULSION INTRAVENOUS at 08:29

## 2023-01-30 RX ADMIN — Medication 3000 MG: at 07:30

## 2023-01-30 RX ADMIN — TRANEXAMIC ACID 1000 MG: 100 INJECTION, SOLUTION INTRAVENOUS at 07:45

## 2023-01-30 RX ADMIN — HYDROMORPHONE HYDROCHLORIDE 0.5 MG: 1 INJECTION, SOLUTION INTRAMUSCULAR; INTRAVENOUS; SUBCUTANEOUS at 08:13

## 2023-01-30 RX ADMIN — SODIUM CHLORIDE: 9 INJECTION, SOLUTION INTRAVENOUS at 07:11

## 2023-01-30 RX ADMIN — Medication 3000 MG: at 18:59

## 2023-01-30 RX ADMIN — PROPOFOL 50 MG: 10 INJECTION, EMULSION INTRAVENOUS at 08:16

## 2023-01-30 RX ADMIN — ROCURONIUM BROMIDE 10 MG: 100 INJECTION, SOLUTION INTRAVENOUS at 08:54

## 2023-01-30 RX ADMIN — ROCURONIUM BROMIDE 20 MG: 100 INJECTION, SOLUTION INTRAVENOUS at 08:48

## 2023-01-30 RX ADMIN — SUGAMMADEX 500 MG: 100 INJECTION, SOLUTION INTRAVENOUS at 09:47

## 2023-01-30 RX ADMIN — INSULIN GLARGINE 34 UNITS: 100 INJECTION, SOLUTION SUBCUTANEOUS at 20:28

## 2023-01-30 RX ADMIN — LISINOPRIL AND HYDROCHLOROTHIAZIDE 2 TABLET: 12.5; 2 TABLET ORAL at 19:00

## 2023-01-30 RX ADMIN — TIZANIDINE 4 MG: 4 TABLET ORAL at 20:30

## 2023-01-30 RX ADMIN — PROPOFOL 230 MG: 10 INJECTION, EMULSION INTRAVENOUS at 07:35

## 2023-01-30 RX ADMIN — SODIUM CHLORIDE: 9 INJECTION, SOLUTION INTRAVENOUS at 16:30

## 2023-01-30 RX ADMIN — ACETAMINOPHEN 650 MG: 325 TABLET ORAL at 17:43

## 2023-01-30 RX ADMIN — DEXAMETHASONE SODIUM PHOSPHATE 8 MG: 4 INJECTION, SOLUTION INTRAMUSCULAR; INTRAVENOUS at 07:51

## 2023-01-30 RX ADMIN — PROPOFOL 30 MG: 10 INJECTION, EMULSION INTRAVENOUS at 07:41

## 2023-01-30 RX ADMIN — ROCURONIUM BROMIDE 80 MG: 100 INJECTION, SOLUTION INTRAVENOUS at 07:42

## 2023-01-30 RX ADMIN — Medication 180 MG: at 07:35

## 2023-01-30 RX ADMIN — HYDROMORPHONE HYDROCHLORIDE 0.5 MG: 1 INJECTION, SOLUTION INTRAMUSCULAR; INTRAVENOUS; SUBCUTANEOUS at 08:34

## 2023-01-30 RX ADMIN — ROCURONIUM BROMIDE 10 MG: 100 INJECTION, SOLUTION INTRAVENOUS at 09:04

## 2023-01-30 RX ADMIN — MELOXICAM 15 MG: 7.5 TABLET ORAL at 06:56

## 2023-01-30 RX ADMIN — LIDOCAINE HYDROCHLORIDE 100 MG: 20 INJECTION, SOLUTION EPIDURAL; INFILTRATION; INTRACAUDAL; PERINEURAL at 07:34

## 2023-01-30 RX ADMIN — TRANEXAMIC ACID 1000 MG: 100 INJECTION, SOLUTION INTRAVENOUS at 09:41

## 2023-01-30 RX ADMIN — FENTANYL CITRATE 50 MCG: 50 INJECTION INTRAMUSCULAR; INTRAVENOUS at 08:01

## 2023-01-30 RX ADMIN — ATORVASTATIN CALCIUM 20 MG: 20 TABLET, FILM COATED ORAL at 20:24

## 2023-01-30 RX ADMIN — ACETAMINOPHEN 650 MG: 325 TABLET ORAL at 23:57

## 2023-01-30 ASSESSMENT — PAIN DESCRIPTION - FREQUENCY
FREQUENCY: CONTINUOUS
FREQUENCY: CONTINUOUS

## 2023-01-30 ASSESSMENT — PAIN DESCRIPTION - LOCATION
LOCATION: HIP

## 2023-01-30 ASSESSMENT — PAIN DESCRIPTION - ORIENTATION
ORIENTATION: LEFT
ORIENTATION: RIGHT
ORIENTATION: RIGHT
ORIENTATION: LEFT

## 2023-01-30 ASSESSMENT — PAIN - FUNCTIONAL ASSESSMENT
PAIN_FUNCTIONAL_ASSESSMENT: ACTIVITIES ARE NOT PREVENTED
PAIN_FUNCTIONAL_ASSESSMENT: 0-10
PAIN_FUNCTIONAL_ASSESSMENT: PREVENTS OR INTERFERES SOME ACTIVE ACTIVITIES AND ADLS

## 2023-01-30 ASSESSMENT — PAIN DESCRIPTION - DESCRIPTORS
DESCRIPTORS: ACHING

## 2023-01-30 ASSESSMENT — PAIN SCALES - GENERAL
PAINLEVEL_OUTOF10: 2
PAINLEVEL_OUTOF10: 2
PAINLEVEL_OUTOF10: 0
PAINLEVEL_OUTOF10: 2
PAINLEVEL_OUTOF10: 0
PAINLEVEL_OUTOF10: 3
PAINLEVEL_OUTOF10: 2

## 2023-01-30 ASSESSMENT — LIFESTYLE VARIABLES: SMOKING_STATUS: 0

## 2023-01-30 ASSESSMENT — PAIN DESCRIPTION - PAIN TYPE
TYPE: SURGICAL PAIN
TYPE: SURGICAL PAIN

## 2023-01-30 ASSESSMENT — ENCOUNTER SYMPTOMS: SHORTNESS OF BREATH: 0

## 2023-01-30 ASSESSMENT — PAIN DESCRIPTION - ONSET
ONSET: ON-GOING
ONSET: ON-GOING

## 2023-01-30 ASSESSMENT — PAIN SCALES - WONG BAKER: WONGBAKER_NUMERICALRESPONSE: 2

## 2023-01-30 NOTE — H&P
The patient was interviewed and examined and there have been no changes since the documented History and Physical.  I have presented reasonable alternatives to the patient's proposed care, treatment and services. The discussion I have done encompassed risks, benefits and side effects related to the alternatives and the risks related to not receiving the proposed care, treatment and services.   Electronically signed by Joanne Ross MD on 1/30/2023 at 7:07 AM

## 2023-01-30 NOTE — PROGRESS NOTES
Met with patient at bedside, patient is alert and orientedx4. discussed role of nurse navigator. Reviewed reasons to call with questions or concerns, importance of TEDS, Incentive spirometer, pain medication, and physical and occupational therapy. 2/4 bed rails up, bed in lowest position, fall precautions in place, call light within reach. Pulses present bilaterally +2 pedal, no drainage or odor noted at surgical dressing left hip. dry Dressing clean, dry, and intact. Ice in place. Billy and scds on BLEs. Neurovascular checks performed and moderate dorsi and plantar flexions noted bilaterally, toes appear appropriate to ethnicity in color, warm to touch, patient denies numbness or tingling. DC Plan: home with friend Johann Olvera and [facility]. Friend rolan to transport patient. DME needs:needs rolling walker, agreeable to [employer] and [employer] informed.       Roc Bergman  Orthopedic Nurse Navigator  Phone number: [phone]    Future Appointments   Date Time Provider Rene Luu   2/14/2023  9:30 AM MD Lionel Regalado [facility]     Electronically signed by Ching Osorio RN on 1/30/2023 at 4:48 PM

## 2023-01-30 NOTE — OP NOTE
Operative Note      Patient: Anneliese Padron  YOB: 1955  MRN: [id number]    Date of Procedure: 1/30/2023    Pre-Op Diagnosis: OSTEOARTHRITIS LEFT HIP    Post-Op Diagnosis: Same       Procedure(s):  TOTAL HIP REPLACEMENT LEFT HIP    Surgeon(s):  Dedra Camacho MD    Assistant:   * No surgical staff found *    Anesthesia: General    Estimated Blood Loss (mL): 957     Complications: None    Specimens:   * No specimens in log *    Implants:  * No implants in log *      Drains: * No LDAs found *    Findings: severe osteoarthritis left hip. Detailed Description of Procedure:      PATIENT NAME:         Giancarlo Hidden OF BIRTH:         1955   MEDICAL RECORD#         [id number]  SURGERY DATE:         1/30/2023  SURGEON:                 Dedra Camacho MD          PREOPERATIVE DIAGNOSIS: Severe end-stage osteoarthritis Left Hip. POSTOPERATIVE DIAGNOSIS: Severe end-stage osteoarthritis Left hip. PROCEDURE: Left total hip arthroplasty. SURGEON: Dr. Pam Artder: General anesthesia. IV FLUIDS: Crystalloid. ESTIMATED BLOOD LOSS: 300ml . COMPLICATIONS: None. The patient tolerated the procedure quite well. COMPONENTS: A Depuy Actis size 6 standard body high offset stem, a size 56 [place] Gription metal cup, a size 40 + 1.5 femoral head, and a standard highly crosslinked + 4 neutral polyethylene liner with vitamin E.     INDICATIONS:  The patient is a 75 y.o. male with a long-standing   history of left hip pain. X-rays confirmed severe osteoarthritis. Despite conservative measures, the patient had severe persistent pain. Due to this   fact, the patient was ultimately cleared and scheduled for total hip arthroplasty. REPORT: The patient was identified preoperatively. The patient was then   taken to the operating room, and general anesthesia was administered by   Anesthesia. Appropriate preoperative antibiotics were administered per SCIP   measures.  The patient was   then positioned in the lateral decubitus position on the pegboard. All bony   prominences were padded. We then ChloraPrepped the hip, pelvis, and   lower extremity in standard fashion. We then draped out in standard fashion. Our incision was then marked accordingly. We used a standard posterolateral   skin incision. We then sealed off the skin with an Ioban drape. We then were   ready for skin incision. Using a standard posterolateral skin incision, we   incised skin and coagulated all bleeders with Bovie electrocautery. We then   dissected down and identified the gluteofemoral fascia. This was split   longitudinally in line with the muscle fibers. We then used our Charnley   retractor and retracted both anteriorly and posteriorly. The sciatic nerve   was identified and protected at all times. We then retracted the abductors   anteriorly. We took down the piriformis. This was tagged and released. We   then took down the remainder of the short external rotators. The capsule was   then Td. We then expressed the hip out of the wound and dislocated the hip. This worked out extremely well. We then were ready for our femoral cuts. Using our neck cutting guide, we then went ahead and made a neck cut   approximately 1 cm proximal to the lesser trochanter based on templating. This worked out extremely well. We then used our boxed osteotome to remove the bone   laterally. We then used our trochanteric router and reamed the bone   laterally. We then sequentially broached up to 6 mm. Appropriate anteversion was maintained throughout the broaching process. This gave us a nice fit and fill of the canal. We then directed our attention towards the acetabulum. We removed the labrum   in its entirety. We then split the capsule inferiorly. We   then retracted both anteriorly and posteriorly. We then were ready for   reaming. We first gently medialized with a size 48 reamer.  We then went   ahead and reamed sequentially up to 55 mm, maintaining approximately   45 degrees of abduction and approximately 30 degrees of anteversion. This   worked out extremely well. We were satisfied with the preparation of the   acetabulum. We were satisfied with the nice bleeding bed of bone with good   bone. We irrigated rather copiously. We then were ready for insertion of the actual Gription cup. We maintained appropriate anteversion and   abduction. The cup was seated without difficulty. We then inserted a trial   Liner. We then went ahead and inserted our trial stem. The trial head was attached and the hip was reduced. The hip was extremely stable and leg lengths were symmetric with the 40 +1.5 head. The hip was extremely stable both anteriorly and posteriorly. We were able to flex the hip up to 90 degrees and internally rotate to approximately 60 degrees. We then went ahead and removed all trial components. We removed the trial liner. We then went ahead and seated our ultrahigh molecular weight polyethylene   liner with vitamin E. This worked out extremely well. We irrigated the   femoral canal rather copiously. We then went ahead and inserted our actual stem. We then went ahead and attached our 40 +1.5 ceramic head. This worked out extremely well. We reduced the hip. Again, the hip was stable. We irrigated all layers rather copiously. We then irrigated all layers with the Irrisept solution. We closed the capsule with interrupted figure-of-eight #1 Vicryl stitches. We   then reattached the piriformis with interrupted vertical mattress #1 Vicryl stitches. We then closed the gluteofemoral fascia with interrupted   figure-of-eight #1 Vicryl stitches. We then closed the subcutaneous tissues with running strata fix. We then closed the skin with the Prineo. Sterile dressings were applied. The patient was put in an abductor pillow. There were no complications. The patient has a BMI of 48.76. Due to the obesity, dissection and exposure was  much more difficult. The obesity increased the complexity of the procedure and increased the operative time by at least 15 minutes.       Electronically signed by Eliel Camacho MD on 1/30/2023 at 7:07 AM

## 2023-01-30 NOTE — ANESTHESIA PRE PROCEDURE
Department of Anesthesiology  Preprocedure Note       Name:  Daniel De   Age:  75 y.o.  :  1955                                          MRN:  [id number]         Date:  2023      Surgeon: Kisha White):  Maxwell Ham MD    Procedure: Procedure(s):  TOTAL HIP REPLACEMENT LEFT HIP    Medications prior to admission:   Prior to Admission medications    Medication Sig Start Date End Date Taking? Authorizing Provider   metoprolol succinate (TOPROL XL) 25 MG extended release tablet Take 25 mg by mouth daily 23   Historical Provider, MD   atorvastatin (LIPITOR) 20 MG tablet TAKE 1 TABLET BY MOUTH EVERY DAY 10/1/22   Historical Provider, MD   lisinopril-hydroCHLOROthiazide (PRINZIDE;ZESTORETIC) 20-12.5 MG per tablet TAKE 2 TABLETS BY MOUTH EVERY DAY 10/1/22   Historical Provider, MD   tiZANidine (ZANAFLEX) 4 MG tablet Take 4 mg by mouth nightly as needed 22   Historical Provider, MD       Current medications:    Current Facility-Administered Medications   Medication Dose Route Frequency Provider Last Rate Last Admin    meloxicam (MOBIC) tablet 15 mg  15 mg Oral Once Maxwell Ham MD        sodium chloride flush 0.9 % injection 5-40 mL  5-40 mL IntraVENous 2 times per day Ming Calloway MD        sodium chloride flush 0.9 % injection 5-40 mL  5-40 mL IntraVENous PRN Ming Calloway MD        0.9 % sodium chloride infusion   IntraVENous PRN Ming Calloway MD        tranexamic acid (CYKLOKAPRON) 1,000 mg in sodium chloride 0.9 % 60 mL IVPB  1,000 mg IntraVENous Once Maxwell Ham MD        ceFAZolin (ANCEF) 3000 mg in sodium chloride 0.9% 100 mL IVPB  3,000 mg IntraVENous Once Maxwell Ham MD           Allergies:  No Known Allergies    Problem List:  There is no problem list on file for this patient.       Past Medical History:        Diagnosis Date    Hip pain, left 2023    HTN (hypertension)     Sleep apnea 2023    wears cpap       Past Surgical History:        Procedure Laterality Date    HERNIA REPAIR      x2       Social History:    Social History     Tobacco Use    Smoking status: Never    Smokeless tobacco: Never   Substance Use Topics    Alcohol use: Yes                                Counseling given: Not Answered      Vital Signs (Current):   Vitals:    01/17/23 1253   Weight: (!) 307 lb (139.3 kg)   Height: 5' 6\" (1.676 m)                                              BP Readings from Last 3 Encounters:   No data found for BP       NPO Status: Time of last liquid consumption: 2130                        Time of last solid consumption: 2100                        Date of last liquid consumption: 01/29/23                        Date of last solid food consumption: 01/29/23    BMI:   Wt Readings from Last 3 Encounters:   01/17/23 (!) 307 lb (139.3 kg)   11/29/22 (!) 307 lb (139.3 kg)     Body mass index is 49.55 kg/m². CBC:   Lab Results   Component Value Date/Time    WBC 9.1 01/23/2023 11:10 AM    RBC 4.95 01/23/2023 11:10 AM    HGB 14.4 01/23/2023 11:10 AM    HCT 43.5 01/23/2023 11:10 AM    MCV 87.9 01/23/2023 11:10 AM    RDW 15.5 01/23/2023 11:10 AM     01/23/2023 11:10 AM       CMP:   Lab Results   Component Value Date/Time     01/23/2023 11:10 AM    K 4.2 01/23/2023 11:10 AM     01/23/2023 11:10 AM    CO2 28 01/23/2023 11:10 AM    BUN 16 01/23/2023 11:10 AM    CREATININE 0.7 01/23/2023 11:10 AM    GFRAA >60 09/27/2017 09:19 AM    LABGLOM >60 01/23/2023 11:10 AM    GLUCOSE 94 01/23/2023 11:10 AM    CALCIUM 10.1 01/23/2023 11:10 AM       POC Tests: No results for input(s): POCGLU, POCNA, POCK, POCCL, POCBUN, POCHEMO, POCHCT in the last 72 hours.     Coags:   Lab Results   Component Value Date/Time    PROTIME 13.6 01/23/2023 11:10 AM    INR 1.05 01/23/2023 11:10 AM       HCG (If Applicable): No results found for: PREGTESTUR, PREGSERUM, HCG, HCGQUANT     ABGs: No results found for: PHART, PO2ART, UDX4PWN, SEN3LTE, BEART, A7IUUACS     Type & Screen (If Applicable):  No results found for: LABABO, LABRH    Drug/Infectious Status (If Applicable):  No results found for: HIV, HEPCAB    COVID-19 Screening (If Applicable): No results found for: COVID19        Anesthesia Evaluation  Patient summary reviewed and Nursing notes reviewed no history of anesthetic complications:   Airway: Mallampati: III  TM distance: >3 FB   Neck ROM: full  Mouth opening: > = 3 FB   Dental: normal exam         Pulmonary: breath sounds clear to auscultation  (+) sleep apnea:      (-) pneumonia, COPD, asthma, shortness of breath, recent URI and not a current smoker                           Cardiovascular:  Exercise tolerance: Limited by hip pain  (+) hypertension:,     (-) pacemaker, valvular problems/murmurs, past MI, CAD, CABG/stent, dysrhythmias,  angina,  CHF, orthopnea, PND,  HE[place], no pulmonary hypertension and no hyperlipidemia      Rhythm: regular                      Neuro/Psych:      (-) seizures, neuromuscular disease, TIA, CVA, headaches, psychiatric history and depression/anxiety            GI/Hepatic/Renal:   (+) morbid obesity     (-) hiatal hernia, GERD, PUD, hepatitis, liver disease, no renal disease and bowel prep       Endo/Other:    (+) : arthritis:., no malignancy/cancer. (-) diabetes mellitus, hypothyroidism, hyperthyroidism, blood dyscrasia, no electrolyte abnormalities, no malignancy/cancer               Abdominal:             Vascular:     - PVD, DVT and PE. Other Findings:           Anesthesia Plan      general     ASA 3       Induction: intravenous. MIPS: Postoperative opioids intended, Prophylactic antiemetics administered and Postoperative trial extubation. Anesthetic plan and risks discussed with patient and spouse. Plan discussed with CRNA.               This pre-anesthesia assessment may be used as a history and physical.    DOS STAFF ADDENDUM:    Pt seen and examined, chart reviewed (including anesthesia, drug and allergy history). No interval changes to history and physical examination. Anesthetic plan, risks, benefits, alternatives, and personnel involved discussed with patient. Patient verbalized an understanding and agrees to proceed.       Shawn Rangel MD  January 30, 2023  6:52 AM          Shawn Rangel MD   1/30/2023

## 2023-01-30 NOTE — PROGRESS NOTES
Patient to PACU from OR. Patient VSS, placed on CPAP for sleep apnea while patient is still waking up.

## 2023-01-30 NOTE — ANESTHESIA POSTPROCEDURE EVALUATION
Department of Anesthesiology  Postprocedure Note    Patient: Negar Thomas  MRN: [id number]  YOB: 1955  Date of evaluation: 1/30/2023      Procedure Summary     Date: 01/30/23 Room / Location: [address] [place] [address]    Anesthesia Start: 0730 Anesthesia Stop: 5121    Procedure: TOTAL HIP REPLACEMENT LEFT HIP (Left: Hip) Diagnosis:       Osteoarthritis of left hip, unspecified osteoarthritis type      (OSTEOARTHRITIS LEFT HIP)    Surgeons: Sheron Holstein, MD Responsible Provider: Mardeen Opitz, MD    Anesthesia Type: general ASA Status: 3          Anesthesia Type: No value filed.     Kevin Phase I: Kevin Score: 10    Kevin Phase II:        Anesthesia Post Evaluation    Patient location during evaluation: PACU  Patient participation: complete - patient participated  Level of consciousness: awake  Airway patency: patent  Nausea & Vomiting: no nausea and no vomiting  Complications: no  Cardiovascular status: blood pressure returned to baseline  Respiratory status: acceptable  Hydration status: euvolemic

## 2023-01-30 NOTE — PROGRESS NOTES
Physical Therapy  Facility/Department: [address] ORTHOPEDICS  Physical Therapy Initial Assessment    Name: Christopher Kinsey  : 1955  MRN: [id number]  Date of Service: 2023    Assessment / Discharge Recommendations:  -right THR   -good start with initial efforts mobilizing from bed  -will see in AM to assess readiness for home   -will need wide- heavy duty rolling walker for home use      Patient Diagnosis(es): The encounter diagnosis was Primary osteoarthritis of left hip. Past Medical History:  has a past medical history of Hip pain, left, HTN (hypertension), and Sleep apnea. Past Surgical History:  has a past surgical history that includes hernia repair. Body Structures, Functions, Activity Limitations Requiring Skilled Therapeutic Intervention: Decreased functional mobility ; Decreased ADL status; Decreased ROM; Increased pain;Decreased balance;Decreased strength  Therapy Prognosis: Good  Decision Making: Medium Complexity  Requires PT Follow-Up: Yes  Activity Tolerance  Activity Tolerance: Patient tolerated treatment well     Plan   Physcial Therapy Plan  Current Treatment Recommendations: Strengthening, Functional mobility training, Transfer training, ADL/Self-care training, Gait training, Stair training, Modalities, Positioning, Therapeutic activities, Patient/Caregiver education & training  Additional Comments: 1-4 sessions  Safety Devices  Type of Devices: Call light within reach, Chair alarm in place, Gait belt, Patient at risk for falls, Nurse notified, Left in chair     Restrictions  Restrictions/Precautions  Restrictions/Precautions: Weight Bearing, Fall Risk  Lower Extremity Weight Bearing Restrictions  Left Lower Extremity Weight Bearing: Weight Bearing As Tolerated  Position Activity Restriction  Hip Precautions: Posterior hip precautions, No ADduction, No hip flexion > 90 degrees, No hip internal rotation  Other position/activity restrictions: No adduction, flexion beyond 90 degrees of operative hip. Keep \"toes to dana\" in bed. Follow these restrictions for 3 months postoperatively or until restrictions are released from surgeon. Subjective   General  Chart Reviewed: Yes  Patient assessed for rehabilitation services?: Yes  Additional Pertinent Hx: here for elective left THR  Response To Previous Treatment: Not applicable  Family / Caregiver Present: No  Follows Commands: Within Functional Limits  Subjective  Subjective: arrived to room along with OT to patient resting in bed - he is alert oriented and agreeable to PTOT assessments and up to ambulate in room  - states pain is minimal at rest  Social/Functional History  Social/Functional History  Lives With: Friend(s)  Type of Home: House  Home Layout: One level, Laundry in basement, Able to Live on Main level with bedroom/bathroom  Home Access: Stairs to enter with rails  Entrance Stairs - Number of Steps: 3  Bathroom Shower/Tub: Tub/Shower unit, Curtain  Bathroom Toilet:  (comfort height- vanity close)  Bathroom Accessibility: Accessible  Has the patient had two or more falls in the past year or any fall with injury in the past year?: No  ADL Assistance: Independent  Homemaking Assistance: Independent  Ambulation Assistance: Independent  Transfer Assistance: Independent  Active : Yes  Additional Comments: need hip kit and RW  Vision/Hearing  Vision  Vision: Within Functional Limits  Hearing  Hearing: Within functional limits    Cognition   Orientation  Overall Orientation Status: Within Functional Limits  Cognition  Overall Cognitive Status: WFL     Objective   Gross Assessment  Tone: Normal  Sensation: Intact   Strength Other  Other: grossly wfl non-surgical limbs  Bed mobility  Supine to Sit: Moderate assistance  Scooting: Minimal assistance  Bed Mobility Comments: HOB elevated.  use of rails  Transfers  Sit to Stand: Contact guard assistance  Stand to Sit: Contact guard assistance  Ambulation  Surface: Level tile  Device: Rolling Walker  Assistance: Contact guard assistance  Quality of Gait: step to pattern leading with left LE   - good heel contact and fair weight bearing  Distance: in room for ~35 feet  Comments: stable on the walker  More Ambulation?: No  Stairs/Curb  Stairs?: No  Balance  Comments: midline in sitting at the EOB and in static stance on the walker   -dynamic is fair on rolling walker with light cga for safety       Goals  Short Term Goals  Time Frame for Short Term Goals: 1-2 days  Short Term Goal 1: bed mobility at [facility]  Short Term Goal 2: transfers at supervision  Short Term Goal 3: ambulation at supervision wbat rolling walker for household distances   -steps as needed for home entry at [facility] one rail  Short Term Goal 4: exercises at supervision  Short Term Goal 5: verbalize and demonstrate posterior hip precautions  Patient Goals   Patient Goals : relief of chronic right hip pain       Education  Patient Education  Education Given To: Patient  Education Provided: Role of Therapy;Plan of Care;Precautions  Education Method: Verbal;Demonstration  Barriers to Learning: None  Education Outcome: Verbalized understanding;Continued education needed      Therapy Time   Individual Concurrent Group Co-treatment   Time In 1530         Time Out 1610         Minutes 111 Jerome Collins, PT

## 2023-01-30 NOTE — PROGRESS NOTES
Patient arrived to unit from PACU and was placed into room 3105. Patient A&O x 4. VSS. Patient oriented to room, unit routine, call light/telephone use,and meal ordering process. Patient verbalizes understanding of fall precautions and agreeable to call for help before ambulating. Patient able to make needs known and denies physical/emotional needs at this time. Bed locked and in lowest position, alarm on, side rails up x2. Call light and all belongings within reach. Will continue to check on patient every two hours and as needed to monitor safety and assess needs.  Electronically signed by Ita Keller RN on 1/30/2023 at 3:23 PM

## 2023-01-30 NOTE — CARE COORDINATION
Referral per clinical path. Met with patient and confirmed plans to return to home with friend Brandon Betancourt and [facility]. Verified demographics and that the following DME is needed:wide rolling walker   Patient denies any other needs or concerns. Referred to [facility] and [facility] liaison who will follow up with patient.     Electronically signed by KERRY Howell, LETI, Case Management on 1/30/2023 at 4:09 PM  [place] [id number]

## 2023-01-30 NOTE — PROGRESS NOTES
Occupational Therapy  Facility/Department: [address] ORTHOPEDICS  Occupational Therapy Initial Assessment    Name: Sima Rich  : 1955  MRN: [id number]  Date of Service: 2023    Discharge Recommendations:  2-3 sessions per week, Patient would benefit from continued therapy after discharge, Home with Home health OT, 24 hour supervision or assist        Sima Rich scored a 18/24 on the AM-PAC ADL Inpatient form. Current research shows that an AM-PAC score of 18 or greater is typically associated with a discharge to the patient's home setting. Based on the patient's AM-PAC score, and their current ADL deficits, it is recommended that the patient have 2-3 sessions per week of Occupational Therapy at d/c to increase the patient's independence. At this time, this patient demonstrates the endurance and safety to discharge home with [address] [place] [address] (home vs OP services) and a follow up treatment frequency of 2-3x/wk. Please see assessment section for further patient specific details. If patient discharges prior to next session this note will serve as a discharge summary. Please see below for the latest assessment towards goals. Patient Diagnosis(es): The encounter diagnosis was Primary osteoarthritis of left hip. Past Medical History:  has a past medical history of Hip pain, left, HTN (hypertension), and Sleep apnea. Past Surgical History:  has a past surgical history that includes hernia repair. Treatment Diagnosis: impaired ADL/fxl mobility    Assessment   Performance deficits / Impairments: Decreased functional mobility ; Decreased endurance;Decreased ADL status; Decreased high-level IADLs;Decreased balance;Decreased safe awareness  Assessment: 74 yo male admitted  for elective R SORAYA -posterior precautions now WBAT. PMH: COURTNEY, HTN. PTA, pt lives with friend and fully IND no AD use. Today, pt functioning below baseline limited by decreased endurance and posterior hip precations (pt educated on).  Pt completes bed mobility Mod A, and fxl tx, fxl mobility with RW household distances, toileting in stance, and sink side grooming with CGA. Pt with slow steady pace. Pt required Max A donning socks and cira hose. Cont acute OT to address above deficits. Rec pt return home with family for 24 hr SUP and OT 2-3x/week to ensure return to PLOF  Treatment Diagnosis: impaired ADL/fxl mobility  Prognosis: Good  Decision Making: Medium Complexity  REQUIRES OT FOLLOW-UP: Yes  Activity Tolerance  Activity Tolerance: Patient Tolerated treatment well;Patient limited by fatigue        Plan   Occupational Therapy Plan  Times Per Week: 2-3 sessions  Current Treatment Recommendations: Strengthening, ROM, Balance training, Functional mobility training, Endurance training, Safety education & training, Patient/Caregiver education & training, Modalities, Positioning, Self-Care / ADL, Home management training, Pain management     Restrictions  Restrictions/Precautions  Restrictions/Precautions: Weight Bearing, Fall Risk  Lower Extremity Weight Bearing Restrictions  Left Lower Extremity Weight Bearing: Weight Bearing As Tolerated  Position Activity Restriction  Hip Precautions: Posterior hip precautions, No ADduction, No hip flexion > 90 degrees, No hip internal rotation  Other position/activity restrictions: No adduction, flexion beyond 90 degrees of operative hip. Keep \"toes to dana\" in bed. Follow these restrictions for 3 months postoperatively or until restrictions are released from surgeon. Subjective   General  Chart Reviewed: Yes  Patient assessed for rehabilitation services?: Yes  Additional Pertinent Hx: 74 yo male admitted 1/30 for elective R SORAYA -posterior precautions now WBAT. PMH: COURTNEY, HTN  Family / Caregiver Present: No  Referring Practitioner: Henrik Quinones MD  Diagnosis: R SORAYA  Subjective  Subjective: Pt resting in bed upon arrival and agreeable to OT/PT eval. Pt reporting little to no L hip pain throughout.   General Comment  Comments: RN ok to see       Social/Functional History  Social/Functional History  Lives With: Friend(s)  Type of Home: House  Home Layout: One level, Laundry in basement, Able to Live on Main level with bedroom/bathroom  Home Access: Stairs to enter with rails  Entrance Stairs - Number of Steps: 3  Bathroom Shower/Tub: Tub/Shower unit, Curtain  Bathroom Toilet:  (comfort height- vanity close)  Bathroom Accessibility: Accessible  Has the patient had two or more falls in the past year or any fall with injury in the past year?: No  ADL Assistance: [address] [place] [address] Avenue: Independent  Ambulation Assistance: Independent  Transfer Assistance: Independent  Active : Yes  Additional Comments: need hip kit and RW       Objective              Safety Devices  Type of Devices: Call light within reach; Chair alarm in place;Gait belt;Patient at risk for falls;Nurse notified; Left in chair          AROM: Within functional limits  PROM: Within functional limits  Strength: Within functional limits  Coordination: Within functional limits  Tone: Normal    ADL  Grooming: Contact guard assistance  Grooming Skilled Clinical Factors: sink side hand washing  LE Dressing: Dependent/Total  LE Dressing Skilled Clinical Factors: donning socks and cira hose  Toileting: Contact guard assistance  Toileting Skilled Clinical Factors: standing over toilet and urinates at commode. pt manages clothing          Bed mobility  Supine to Sit: Moderate assistance  Scooting: Minimal assistance  Bed Mobility Comments: HOB elevated. use of rails    Transfers  Sit to stand: Contact guard assistance  Stand to sit: Contact guard assistance  Transfer Comments: RW. cues hand placement. Standing balance: CGA x4 min to urinates at toilet + 2 min sink side                   Fxl mobility: CGA with RW. EOB>bathroom>recliner with min unsteadiness. slower pace.  appropriate RW management    Vision  Vision: Within Functional Limits  Hearing  Hearing: Within functional limits    Cognition  Overall Cognitive Status: WFL  Orientation  Overall Orientation Status: Within Functional Limits                    Education Given To: Patient  Education Provided: Role of Therapy;Plan of Care;Transfer Training;Precautions  Education Provided Comments: post hip precautions  Education Method: Demonstration;Verbal  Barriers to Learning: None  Education Outcome: Verbalized understanding;Continued education needed;Demonstrated understanding                      AM-PAC Score        AM-PAC Inpatient Daily Activity Raw Score: 18 (01/30/23 1617)  AM-PAC Inpatient ADL T-Scale Score : 38.66 (01/30/23 1617)  ADL Inpatient CMS 0-100% Score: 46.65 (01/30/23 1617)  ADL Inpatient CMS G-Code Modifier : CK (01/30/23 1617)    Goals  Short Term Goals  Time Frame for Short Term Goals: prior to d/c  Short Term Goal 1: toileting SUP  Short Term Goal 2: LB dressing with AE SUP  Short Term Goal 3: UB bathing. dressing SUP  Short Term Goal 4: fxl tx and mobility with RW household distances SUP  Short Term Goal 5: tolerate 5 min fxl standing task SUP  Patient Goals   Patient goals : return home       Therapy Time   Individual Concurrent Group Co-treatment   Time In 1530         Time Out 1610         Minutes 40         Timed Code Treatment Minutes: 25 Minutes (15 eval. 15 ADL 10 TA)       SAIMA Hernández, OTR/L

## 2023-01-30 NOTE — PROGRESS NOTES
[facility] Orthopedic Surgery   Progress Note      S/P :  SUBJECTIVE  up in recliner. Alert and oriented. States he is hungry. Pain is   described in left buttock he says and with the intensity of moderate with activity and none at rest. . Pain is described as aching. OBJECTIVE              Physical                      VITALS:  /79   Pulse 87   Temp 97.7 °F (36.5 °C) (Oral)   Resp 14   Ht 5' 6\" (1.676 m)   Wt (!) 302 lb 1.6 oz (137 kg)   SpO2 93%   BMI 48.76 kg/m²                     MUSCULOSKELETAL:  left foot NVI. Wiggles toes to command. Able to plantarflex and dorsiflex ankle Pedal pulses are palpable. NEUROLOGIC:                                  Sensory:  Touch:  Left Lower Extremity:  normal                                                 Surgical wound appears clean and dry left posterior hip with gauze and tape dressing JANET hose on.      Data       CBC:   Lab Results   Component Value Date/Time    WBC 9.1 01/23/2023 11:10 AM    RBC 4.95 01/23/2023 11:10 AM    HGB 14.4 01/23/2023 11:10 AM    HCT 43.5 01/23/2023 11:10 AM    MCV 87.9 01/23/2023 11:10 AM    MCH 29.1 01/23/2023 11:10 AM    MCHC 33.1 01/23/2023 11:10 AM    RDW 15.5 01/23/2023 11:10 AM     01/23/2023 11:10 AM    MPV 7.9 01/23/2023 11:10 AM        WBC:    Lab Results   Component Value Date/Time    WBC 9.1 01/23/2023 11:10 AM        Hemoglobin/Hematocrit:    Lab Results   Component Value Date/Time    HGB 14.4 01/23/2023 11:10 AM    HCT 43.5 01/23/2023 11:10 AM        PT/INR:    Lab Results   Component Value Date/Time    PROTIME 13.6 01/23/2023 11:10 AM    INR 1.05 01/23/2023 11:10 AM              Current Inpatient Medications             Current Facility-Administered Medications: atorvastatin (LIPITOR) tablet 20 mg, 20 mg, Oral, Nightly  lisinopril-hydroCHLOROthiazide (PRINZIDE;ZESTORETIC) 20-12.5 MG per tablet 2 tablet, 2 tablet, Oral, Daily  [START ON 1/31/2023] metoprolol succinate (TOPROL XL) extended release tablet 25 mg, 25 mg, Oral, Daily  tiZANidine (ZANAFLEX) tablet 4 mg, 4 mg, Oral, Nightly PRN  insulin glargine (LANTUS) injection vial 34 Units, 0.25 Units/kg, SubCUTAneous, Nightly  insulin lispro (HUMALOG) injection vial 11 Units, 0.08 Units/kg, SubCUTAneous, TID WC  glucose chewable tablet 16 g, 4 tablet, Oral, PRN  dextrose bolus 10% 125 mL, 125 mL, IntraVENous, PRN **OR** dextrose bolus 10% 250 mL, 250 mL, IntraVENous, PRN  glucagon (rDNA) injection 1 mg, 1 mg, SubCUTAneous, PRN  dextrose 10 % infusion, , IntraVENous, Continuous PRN  0.9 % sodium chloride infusion, , IntraVENous, Continuous  sodium chloride flush 0.9 % injection 5-40 mL, 5-40 mL, IntraVENous, 2 times per day  sodium chloride flush 0.9 % injection 5-40 mL, 5-40 mL, IntraVENous, PRN  0.9 % sodium chloride infusion, , IntraVENous, PRN  acetaminophen (TYLENOL) tablet 650 mg, 650 mg, Oral, Q6H  oxyCODONE (ROXICODONE) immediate release tablet 5 mg, 5 mg, Oral, Q4H PRN **OR** oxyCODONE HCl (OXY-IR) immediate release tablet 10 mg, 10 mg, Oral, Q4H PRN  HYDROmorphone (DILAUDID) injection 0.25 mg, 0.25 mg, IntraVENous, Q3H PRN **OR** HYDROmorphone (DILAUDID) injection 0.5 mg, 0.5 mg, IntraVENous, Q3H PRN  sennosides-docusate sodium (SENOKOT-S) 8.6-50 MG tablet 1 tablet, 1 tablet, Oral, BID  hydrOXYzine HCl (ATARAX) tablet 10 mg, 10 mg, Oral, Q8H PRN  promethazine (PHENERGAN) tablet 12.5 mg, 12.5 mg, Oral, Q6H PRN **OR** ondansetron (ZOFRAN) injection 4 mg, 4 mg, IntraVENous, Q6H PRN  famotidine (PEPCID) tablet 20 mg, 20 mg, Oral, BID **OR** famotidine (PEPCID) 20 mg in sodium chloride (PF) 0.9 % 10 mL injection, 20 mg, IntraVENous, BID  insulin lispro (HUMALOG) injection vial 0-4 Units, 0-4 Units, SubCUTAneous, TID WC  insulin lispro (HUMALOG) injection vial 0-4 Units, 0-4 Units, SubCUTAneous, Nightly  [START ON 1/31/2023] apixaban (ELIQUIS) tablet 2.5 mg, 2.5 mg, Oral, BID  ceFAZolin (ANCEF) 3000 mg in sodium chloride 0.9% 100 mL IVPB, 3,000 mg, IntraVENous, Q8H    ASSESSMENT AND PLAN    Post left SORAYA, stable exam  DVT prophylaxis ordered, Eliquis bid for 30 total days after discharge from hospital for DVT prophylaxis , Pt is \"high\" risk with BMI>40  PT OT for ADL's and ambulation as tolerated, posterior hip precautions  SS for DC planning, home with home care tomorrow  IV or PO pain med as ordered       Nick Flores, JAIDEN - CNP  1/30/2023  4:02 PM

## 2023-01-31 VITALS
RESPIRATION RATE: 20 BRPM | DIASTOLIC BLOOD PRESSURE: 74 MMHG | HEIGHT: 66 IN | BODY MASS INDEX: 49.18 KG/M2 | SYSTOLIC BLOOD PRESSURE: 130 MMHG | HEART RATE: 97 BPM | WEIGHT: 306 LBS | OXYGEN SATURATION: 95 % | TEMPERATURE: 98.4 F

## 2023-01-31 LAB
GLUCOSE BLD-MCNC: 135 MG/DL (ref 70–99)
HCT VFR BLD CALC: 35.9 % (ref 40.5–52.5)
HEMOGLOBIN: 11.8 G/DL (ref 13.5–17.5)
PERFORMED ON: ABNORMAL

## 2023-01-31 PROCEDURE — 97116 GAIT TRAINING THERAPY: CPT

## 2023-01-31 PROCEDURE — 6370000000 HC RX 637 (ALT 250 FOR IP): Performed by: NURSE PRACTITIONER

## 2023-01-31 PROCEDURE — 2580000003 HC RX 258: Performed by: ORTHOPAEDIC SURGERY

## 2023-01-31 PROCEDURE — 94760 N-INVAS EAR/PLS OXIMETRY 1: CPT

## 2023-01-31 PROCEDURE — 85018 HEMOGLOBIN: CPT

## 2023-01-31 PROCEDURE — 36415 COLL VENOUS BLD VENIPUNCTURE: CPT

## 2023-01-31 PROCEDURE — 97530 THERAPEUTIC ACTIVITIES: CPT

## 2023-01-31 PROCEDURE — 97110 THERAPEUTIC EXERCISES: CPT

## 2023-01-31 PROCEDURE — 6360000002 HC RX W HCPCS: Performed by: ORTHOPAEDIC SURGERY

## 2023-01-31 PROCEDURE — 85014 HEMATOCRIT: CPT

## 2023-01-31 PROCEDURE — 6370000000 HC RX 637 (ALT 250 FOR IP): Performed by: ORTHOPAEDIC SURGERY

## 2023-01-31 PROCEDURE — 97535 SELF CARE MNGMENT TRAINING: CPT

## 2023-01-31 RX ADMIN — METOPROLOL SUCCINATE 25 MG: 25 TABLET, EXTENDED RELEASE ORAL at 08:12

## 2023-01-31 RX ADMIN — SENNOSIDES AND DOCUSATE SODIUM 1 TABLET: 50; 8.6 TABLET ORAL at 08:13

## 2023-01-31 RX ADMIN — ACETAMINOPHEN 650 MG: 325 TABLET ORAL at 11:00

## 2023-01-31 RX ADMIN — Medication 3000 MG: at 00:00

## 2023-01-31 RX ADMIN — FAMOTIDINE 20 MG: 20 TABLET, FILM COATED ORAL at 08:13

## 2023-01-31 RX ADMIN — ACETAMINOPHEN 650 MG: 325 TABLET ORAL at 05:03

## 2023-01-31 RX ADMIN — INSULIN LISPRO 11 UNITS: 100 INJECTION, SOLUTION INTRAVENOUS; SUBCUTANEOUS at 08:14

## 2023-01-31 RX ADMIN — APIXABAN 2.5 MG: 2.5 TABLET, FILM COATED ORAL at 05:03

## 2023-01-31 RX ADMIN — Medication 10 ML: at 08:13

## 2023-01-31 RX ADMIN — LISINOPRIL AND HYDROCHLOROTHIAZIDE 2 TABLET: 12.5; 2 TABLET ORAL at 08:13

## 2023-01-31 RX ADMIN — OXYCODONE HYDROCHLORIDE 10 MG: 10 TABLET ORAL at 08:59

## 2023-01-31 RX ADMIN — OXYCODONE HYDROCHLORIDE 10 MG: 10 TABLET ORAL at 05:03

## 2023-01-31 ASSESSMENT — PAIN DESCRIPTION - ORIENTATION
ORIENTATION: LEFT

## 2023-01-31 ASSESSMENT — PAIN SCALES - GENERAL
PAINLEVEL_OUTOF10: 0
PAINLEVEL_OUTOF10: 0
PAINLEVEL_OUTOF10: 7
PAINLEVEL_OUTOF10: 2
PAINLEVEL_OUTOF10: 7
PAINLEVEL_OUTOF10: 2
PAINLEVEL_OUTOF10: 2

## 2023-01-31 ASSESSMENT — PAIN DESCRIPTION - LOCATION
LOCATION: HIP

## 2023-01-31 ASSESSMENT — PAIN - FUNCTIONAL ASSESSMENT
PAIN_FUNCTIONAL_ASSESSMENT: ACTIVITIES ARE NOT PREVENTED
PAIN_FUNCTIONAL_ASSESSMENT: PREVENTS OR INTERFERES SOME ACTIVE ACTIVITIES AND ADLS
PAIN_FUNCTIONAL_ASSESSMENT: PREVENTS OR INTERFERES SOME ACTIVE ACTIVITIES AND ADLS

## 2023-01-31 ASSESSMENT — PAIN SCALES - WONG BAKER
WONGBAKER_NUMERICALRESPONSE: 4
WONGBAKER_NUMERICALRESPONSE: 2
WONGBAKER_NUMERICALRESPONSE: 2

## 2023-01-31 ASSESSMENT — PAIN DESCRIPTION - DESCRIPTORS
DESCRIPTORS: ACHING
DESCRIPTORS: ACHING

## 2023-01-31 ASSESSMENT — PAIN DESCRIPTION - PAIN TYPE
TYPE: ACUTE PAIN;SURGICAL PAIN
TYPE: ACUTE PAIN;SURGICAL PAIN

## 2023-01-31 ASSESSMENT — PAIN DESCRIPTION - FREQUENCY
FREQUENCY: CONTINUOUS
FREQUENCY: CONTINUOUS

## 2023-01-31 ASSESSMENT — PAIN DESCRIPTION - ONSET
ONSET: ON-GOING
ONSET: ON-GOING

## 2023-01-31 NOTE — CARE COORDINATION
DISCHARGE SUMMARY   DATE OF DISCHARGE: 1/31/23    DISCHARGE DESTINATION: home    HOME CARE: Yes    Agency Name: [facility]  Discharging to Facility/ Agency   Name:  [facility] care    Address: [address] [place] [address]., [facility]., [place] [address]  Phone: [phone]  Fax: [phone]     Notified: RN, Family, and Facility/Agency    TRANSPORTATION: Private Car    NEW DME ORDERED: yes  RW per [employer]  Electronically signed by Sera Cabral on 1/31/2023 at 8:28 AM  #[phone]

## 2023-01-31 NOTE — PROGRESS NOTES
Patient alert and oriented. Pain tolerable. Oxycodone 10 mg given to work with PT/OT. Left hip prineo dressing clean and dry. Ice packs in place. Tolerating ambulation with walker. VSS. Morning medications given without difficulty. Bed alarm on. Call light and belongings within reach. Will monitor.      Electronically signed by Valentina Swenson RN on 1/31/2023 at 10:39 AM

## 2023-01-31 NOTE — PROGRESS NOTES
Huddle performed this morning including Nurse navigator Franco, Physical therapist Rachael Bernstein, and Occupational therapist Joanne Goetz. Discussed plan of care, discharge plan, and dme needs if applicable for orthopedic total joint patient.

## 2023-01-31 NOTE — ACP (ADVANCE CARE PLANNING)
Advance Care Planning   Advance Care Planning Note  Ambulatory Spiritual Care Services    Date:  12/1/2022    Received request from IDT member. Consultation conversation participants:   Patient who understands ACP conversation     Goals of ACP Conversation:  Discuss advance care planning documents  Facilitate a discussion related to patient's goals of care as they align with the patient's values and beliefs. Health Care Decision Makers:    No healthcare decision makers have been documented. Click here to complete [address] including selection of the Healthcare Decision Maker Relationship (ie \"Primary\")   Summary:  No Decision Maker named by patient at this time    Advance Care Planning Documents (Patient Wishes)  Currently on file:   None    Assessment:   Pt expressed desire to review documents. Pt will follow up with  when ready. Interventions:  Provided education on documents for clarity and greater understanding  Discussed and provided education on state decision maker hierarchy  Reviewed but did not complete ACP document    Care Preferences Communicated:   No    Outcomes:  ACP Discussion: Postponed    Patient / Healthcare Decision Maker Instructions: Follow up with  to continue their ACP conversation.     Electronically signed by Trevin Strauss on 1/31/2023 at 2:50 PM.

## 2023-01-31 NOTE — PROGRESS NOTES
[facility] Orthopedic Surgery   Progress Note      S/P :  SUBJECTIVE  in bed. Alert and oriented. . Pain is   described in left posterior hip and with the intensity of moderate. Pain is described as aching. OBJECTIVE              Physical                      VITALS:  /74   Pulse 97   Temp 98.4 °F (36.9 °C) (Oral)   Resp 20   Ht 5' 6\" (1.676 m)   Wt (!) 306 lb (138.8 kg)   SpO2 95%   BMI 49.39 kg/m²                     MUSCULOSKELETAL:  left foot NVI. Wiggles toes to command. Able to plantarflex and dorsiflex ankle Pedal pulses are palpable. NEUROLOGIC:                                  Sensory:  Touch:  Left Lower Extremity:  normal                                                 Surgical wound appears clean and dry left posterior hip with Prineo dressing Ice pack on. JANET hose on.      Data       CBC:   Lab Results   Component Value Date/Time    WBC 9.1 01/23/2023 11:10 AM    RBC 4.95 01/23/2023 11:10 AM    HGB 11.8 01/31/2023 05:42 AM    HCT 35.9 01/31/2023 05:42 AM    MCV 87.9 01/23/2023 11:10 AM    MCH 29.1 01/23/2023 11:10 AM    MCHC 33.1 01/23/2023 11:10 AM    RDW 15.5 01/23/2023 11:10 AM     01/23/2023 11:10 AM    MPV 7.9 01/23/2023 11:10 AM        WBC:    Lab Results   Component Value Date/Time    WBC 9.1 01/23/2023 11:10 AM        Hemoglobin/Hematocrit:    Lab Results   Component Value Date/Time    HGB 11.8 01/31/2023 05:42 AM    HCT 35.9 01/31/2023 05:42 AM        PT/INR:    Lab Results   Component Value Date/Time    PROTIME 13.6 01/23/2023 11:10 AM    INR 1.05 01/23/2023 11:10 AM              Current Inpatient Medications             Current Facility-Administered Medications: atorvastatin (LIPITOR) tablet 20 mg, 20 mg, Oral, Nightly  lisinopril-hydroCHLOROthiazide (PRINZIDE;ZESTORETIC) 20-12.5 MG per tablet 2 tablet, 2 tablet, Oral, Daily  metoprolol succinate (TOPROL XL) extended release tablet 25 mg, 25 mg, Oral, Daily  tiZANidine (ZANAFLEX) tablet 4 mg, 4 mg, Oral, Nightly PRN  insulin glargine (LANTUS) injection vial 34 Units, 0.25 Units/kg, SubCUTAneous, Nightly  insulin lispro (HUMALOG) injection vial 11 Units, 0.08 Units/kg, SubCUTAneous, TID WC  glucose chewable tablet 16 g, 4 tablet, Oral, PRN  dextrose bolus 10% 125 mL, 125 mL, IntraVENous, PRN **OR** dextrose bolus 10% 250 mL, 250 mL, IntraVENous, PRN  glucagon (rDNA) injection 1 mg, 1 mg, SubCUTAneous, PRN  dextrose 10 % infusion, , IntraVENous, Continuous PRN  0.9 % sodium chloride infusion, , IntraVENous, Continuous  sodium chloride flush 0.9 % injection 5-40 mL, 5-40 mL, IntraVENous, 2 times per day  sodium chloride flush 0.9 % injection 5-40 mL, 5-40 mL, IntraVENous, PRN  0.9 % sodium chloride infusion, , IntraVENous, PRN  acetaminophen (TYLENOL) tablet 650 mg, 650 mg, Oral, Q6H  oxyCODONE (ROXICODONE) immediate release tablet 5 mg, 5 mg, Oral, Q4H PRN **OR** oxyCODONE HCl (OXY-IR) immediate release tablet 10 mg, 10 mg, Oral, Q4H PRN  HYDROmorphone (DILAUDID) injection 0.25 mg, 0.25 mg, IntraVENous, Q3H PRN **OR** HYDROmorphone (DILAUDID) injection 0.5 mg, 0.5 mg, IntraVENous, Q3H PRN  sennosides-docusate sodium (SENOKOT-S) 8.6-50 MG tablet 1 tablet, 1 tablet, Oral, BID  hydrOXYzine HCl (ATARAX) tablet 10 mg, 10 mg, Oral, Q8H PRN  promethazine (PHENERGAN) tablet 12.5 mg, 12.5 mg, Oral, Q6H PRN **OR** ondansetron (ZOFRAN) injection 4 mg, 4 mg, IntraVENous, Q6H PRN  famotidine (PEPCID) tablet 20 mg, 20 mg, Oral, BID **OR** famotidine (PEPCID) 20 mg in sodium chloride (PF) 0.9 % 10 mL injection, 20 mg, IntraVENous, BID  insulin lispro (HUMALOG) injection vial 0-4 Units, 0-4 Units, SubCUTAneous, TID WC  insulin lispro (HUMALOG) injection vial 0-4 Units, 0-4 Units, SubCUTAneous, Nightly  apixaban (ELIQUIS) tablet 2.5 mg, 2.5 mg, Oral, BID    ASSESSMENT AND PLAN    Post left SORAAY, stable exam  DVT prophylaxis ordered, Eliquis bid for 30 total days after discharge from hospital for DVT prophylaxis   PT OT for ADL's and ambulation as tolerated, posterior hip precautions  SS for DC planning, home with home care today  IV or PO pain med as ordered       Nisreen Echevarria, JAIDEN - CNP  1/31/2023  8:45 AM

## 2023-01-31 NOTE — PROGRESS NOTES
Clinical Pharmacy Note  Medication Counseling    Reviewed new medications started during hospital admission: eliquis, oxycodone. Indications and side effects were emphasized during counseling. All medication-related questions addressed. Patient verbalized understanding of education. Should the patient express any additional questions or concerns regarding their medications, please do not hesitate to contact the pharmacy department. Patient/caregiver aware they may refuse medications during hospital stay. 5 minutes spent educating patient regarding medications.

## 2023-01-31 NOTE — PROGRESS NOTES
Physical Therapy  Facility/Department: [address] ORTHOPEDICS  Physical Therapy treatment note    Name: Akhil Victoria  : 1955  MRN: [id number]  Date of Service: 2023    Assessment / Discharge Recommendations:  -left THR   wbat  -progressing well and ready for discharge to home today   -instructed in initial HEP and general activity to do until Home PT takes charge of care -written sheet provided   -reviewed posterior hip precautions and he is following them properly   -[employer] issued wide heavy duty rolling walker for home use  -instructed he and his friend in car transfers while maintaining post op hip precautions   -instructed in cold pack use and provided several new cold pack pouches      Patient Diagnosis(es): The encounter diagnosis was Primary osteoarthritis of left hip. Past Medical History:  has a past medical history of Hip pain, left, HTN (hypertension), and Sleep apnea. Past Surgical History:  has a past surgical history that includes hernia repair and Total hip arthroplasty (Left, 2023). Body Structures, Functions, Activity Limitations Requiring Skilled Therapeutic Intervention: Decreased functional mobility ; Decreased ADL status; Decreased ROM; Increased pain;Decreased balance;Decreased strength  Requires PT Follow-Up: Yes  Activity Tolerance  Activity Tolerance: Patient tolerated treatment well     Plan   Physcial Therapy Plan  Current Treatment Recommendations: Strengthening, Functional mobility training, Transfer training, ADL/Self-care training, Gait training, Stair training, Modalities, Positioning, Therapeutic activities, Patient/Caregiver education & training  Additional Comments: 1-4 sessions  Safety Devices  Type of Devices: Call light within reach, Chair alarm in place, Gait belt, Patient at risk for falls, Nurse notified, Left in chair     Restrictions  Restrictions/Precautions  Restrictions/Precautions: Weight Bearing, Fall Risk  Lower Extremity Weight Bearing Restrictions  Left Lower Extremity Weight Bearing: Weight Bearing As Tolerated  Position Activity Restriction  Hip Precautions: Posterior hip precautions, No ADduction, No hip flexion > 90 degrees, No hip internal rotation  Other position/activity restrictions: No adduction, flexion beyond 90 degrees of operative hip. Keep \"toes to dana\" in bed. Follow these restrictions for 3 months postoperatively or until restrictions are released from surgeon. Subjective   General  Chart Reviewed: Yes  Additional Pertinent Hx: here for elective left THR  Response To Previous Treatment: Patient with no complaints from previous session. Family / Caregiver Present: Yes (friend to transport him home)  Follows Commands: Within Functional Limits  Subjective  Subjective: to room in 2 sessions this morning - at both he is alert oriented and agreeable to PT sessions - states pain at rest is minimal and pain medications helpful    Social/Functional History  Social/Functional History  Lives With: Friend(s)  Type of Home: House  Home Layout: One level, Laundry in basement, Able to Live on Main level with bedroom/bathroom  Home Access: Stairs to enter with rails  Entrance Stairs - Number of Steps: 3  Bathroom Shower/Tub: Tub/Shower unit, Curtain  Bathroom Toilet:  (comfort height- vanity close)  Bathroom Accessibility: Accessible  Has the patient had two or more falls in the past year or any fall with injury in the past year?: No  ADL Assistance: Irina: Independent  Ambulation Assistance: Independent  Transfer Assistance: Independent  Active :  Yes  Additional Comments: need hip kit and RW  Vision/Hearing  Vision  Vision: Within Functional Limits  Hearing  Hearing: Within functional limits    Cognition   Orientation  Overall Orientation Status: Within Functional Limits     Objective   Balance  Sitting: Intact  Standing: With support (Stood with RW for ADL tasks)  Bed mobility  Supine to Sit: Contact guard assistance  Sit to Supine: Unable to assess  Transfers  Sit to Stand: Supervision  Stand to Sit: Supervision  Ambulation  Surface: Level tile  Device: Rolling Walker  Assistance: Supervision  Quality of Gait: step to pattern leading with left LE   - good heel contact and fair weight bearing  Distance: in room and Ortho gym for ~70 feet  Comments: steady on the walker  More Ambulation?: No  Stairs/Curb  Stairs?:  (up-down 4 steps one rail at light cga  -up and down one platform step with walker and cga for safety)  Balance  Comments: steady with transfers and ambulation using rolling walker  Goals  Short Term Goals  Time Frame for Short Term Goals: 1-2 days  Short Term Goal 1: bed mobility at [facility]  Short Term Goal 2: transfers at supervision  Short Term Goal 3: ambulation at supervision wbat rolling walker for household distances   -steps as needed for home entry at [facility] one rail  Short Term Goal 4: exercises at supervision  Short Term Goal 5: verbalize and demonstrate posterior hip precautions  Patient Goals   Patient Goals : relief of chronic right hip pain       Education  Patient Education  Education Provided: Transfer Training;Home Exercise Program;Precautions  Education Method: Demonstration;Verbal  Barriers to Learning: None  Education Outcome: Demonstrated understanding      Therapy Time   Individual Concurrent Group Co-treatment   Time In 0920 [phone])         Time Out [id number])         Minutes 48 Nishi Leung, PT

## 2023-01-31 NOTE — DISCHARGE SUMMARY
Physician Discharge Summary     Patient ID:  Oniel Pleitez  [id number]  71 y.o.  1955    Admit date: 1/30/2023    Discharge date and time: 1/31/2023 11:10 AM     Admitting Physician: Charly Willson MD     Discharge Physician: Zachary Cook    Admission Diagnoses: Osteoarthritis of left hip, unspecified osteoarthritis type [M16.12]    Discharge Diagnoses: left hip OA    Admission Condition: good    Discharged Condition: good    Indication for Admission: Failed conservative treatment as outpatient for joint pain including PT and pain meds. This patient was then electively scheduled for total joint replacement surgery    Surgical procedure: left SORAYA    Consults: PT OT SS    This patient had no postoperative complications. They has PT and OT for ADL's . IV and PO pain med for pain control and was eventually DC in stable condition    Treatments: analgesia,  therapies: PT OT,  and surgery      Disposition: home    Patient Instructions:   [unfilled]  Activity: activity as tolerated  Diet: regular diet  Wound Care: keep wound clean and dry    Follow-up with Zachary Cook in 2 weeks.     Signed:  JAIDEN Garrett CNP  1/31/2023  4:52 PM

## 2023-01-31 NOTE — PROGRESS NOTES
Patient used walker ambulating to chair and bathroom, voided enough times overnight, dressing was taken off left hip per orders. Stephen Whelan

## 2023-01-31 NOTE — PROGRESS NOTES
Occupational Therapy  Facility/Department: [address] ORTHOPEDICS  Occupational Daily Treatment Note      Name: Emily Sloan  : 1955  MRN: [id number]  Date of Service: 2023    Discharge Recommendations:  2-3 sessions per week, Patient would benefit from continued therapy after discharge, Home with Home health OT, 24 hour supervision or assist          Patient Diagnosis(es): The encounter diagnosis was Primary osteoarthritis of left hip. Past Medical History:  has a past medical history of Hip pain, left, HTN (hypertension), and Sleep apnea. Past Surgical History:  has a past surgical history that includes hernia repair and Total hip arthroplasty (Left, 2023). Treatment Diagnosis: impaired ADL/fxl mobility    Emily Sloan scored a 18/24 on the AM-PAC ADL Inpatient form. Current research shows that an AM-PAC score of 18 or greater is typically associated with a discharge to the patient's home setting. Based on the patient's AM-PAC score, and their current ADL deficits, it is recommended that the patient have 2-3 sessions per week of Occupational Therapy at d/c to increase the patient's independence. At this time, this patient demonstrates the endurance and safety to discharge home with home (home vs OP services) and a follow up treatment frequency of 2-3x/wk. Please see assessment section for further patient specific details. If patient discharges prior to next session this note will serve as a discharge summary. Please see below for the latest assessment towards goals.    HOME HEALTH CARE: LEVEL 3 SAFETY       -Initial home health evaluation to occur within 24-48 hours, in patient home   -Home health agency to establish plan of care for patient over 60 day period   -Medication Reconciliation   -PT/OT/Speech evaluations in home within 24-48 hours of discharge; including  -DME and home safety   -Frontload therapy 5 days, then 3x a week   -OT to evaluate if patient has [address] needs for personal care   - evaluation within 24-48 hours, includes evaluation of resources   and insurance to determine [place], [place], LTC, and Medicaid options   -PCP Visit scheduled within three to seven days of discharge       Assessment   Performance deficits / Impairments: Decreased functional mobility ; Decreased endurance;Decreased ADL status; Decreased high-level IADLs;Decreased balance;Decreased safe awareness  Assessment: Discussed with OTR am pac score is 18. Anticipate that patient will be able to return home with family to provide 24/7 assist/supervision and home OT. Patient completed sit<>stand from recliner chair to RW to commode with rails and recliner chair wih mild cues for hannd placement. Functional mobility with RW with SBA, slow gait, no overt LOB noted. Able to dress UB with set up. Lower body with Min/Mod A with reacher, sock aid and long shoe horn. Stood with SBA with RW for ADL tasks. Plan is for discharge to home today  REQUIRES OT FOLLOW-UP: Yes  Activity Tolerance  Activity Tolerance: Patient Tolerated treatment well        Plan   Occupational Therapy Plan  Times Per Week: 2-3 sessions  Current Treatment Recommendations: Strengthening, ROM, Balance training, Functional mobility training, Endurance training, Safety education & training, Patient/Caregiver education & training, Modalities, Positioning, Self-Care / ADL, Home management training, Pain management     Restrictions  Restrictions/Precautions  Restrictions/Precautions: Weight Bearing, Fall Risk  Lower Extremity Weight Bearing Restrictions  Left Lower Extremity Weight Bearing: Weight Bearing As Tolerated  Position Activity Restriction  Hip Precautions: Posterior hip precautions, No ADduction, No hip flexion > 90 degrees, No hip internal rotation  Other position/activity restrictions: No adduction, flexion beyond 90 degrees of operative hip. Keep \"toes to dana\" in bed.  Follow these restrictions for 3 months postoperatively or until restrictions are released from surgeon. Subjective   General  Chart Reviewed: Yes  Patient assessed for rehabilitation services?: Yes  Additional Pertinent Hx: 76 yo male admitted 1/30 for elective R SORAYA -posterior precautions now WBAT. PMH: COURTNEY, HTN  Response to previous treatment: Patient with no complaints from previous session  Family / Caregiver Present: Yes (friend)  Referring Practitioner: Charly Willson MD  Diagnosis: R SORAYA  Subjective  Subjective: Patient seated in recliner chair upon arrival to room. Patient with mild reports of pain with mobility  General Comment  Comments: RN ok to see     Social/Functional History  Social/Functional History  Lives With: Friend(s)  Type of Home: House  Home Layout: One level, Laundry in basement, Able to Live on Main level with bedroom/bathroom  Home Access: Stairs to enter with rails  Entrance Stairs - Number of Steps: 3  Bathroom Shower/Tub: Tub/Shower unit, Curtain  Bathroom Toilet:  (comfort height- vanity close)  Bathroom Accessibility: Accessible  Has the patient had two or more falls in the past year or any fall with injury in the past year?: No  ADL Assistance: Independent  Homemaking Assistance: Independent  Ambulation Assistance: Independent  Transfer Assistance: Independent  Active : Yes  Additional Comments: need hip kit and RW       Objective        Safety Devices  Type of Devices: Call light within reach; Chair alarm in place;Gait belt;Patient at risk for falls;Nurse notified; Left in chair  Balance  Sitting: Intact  Standing: With support (Stood with RW for ADL tasks)  Toilet Transfers  Toilet - Technique: Ambulating  Equipment Used: Grab bars  Toilet Transfer: Stand by assistance  Toilet Transfers Comments: cues for hand placement, hip precautions     ADL  Equipment Provided: Reacher;Sock aid;Long-handled shoe horn;Long-handled sponge  Grooming: Stand by assistance  Grooming Skilled Clinical Factors: sink side hand washing  UE Dressing: Setup  LE Dressing: Stand by assistance;Minimal assistance; Moderate assistance  LE Dressing Skilled Clinical Factors: Min/Mod A to thread clothing over feet, SBA for over hips, Mod A for shoes  Toileting: Stand by assistance  Toileting Skilled Clinical Factors: seated to void, SBA for balance during clothing management        Bed mobility  Supine to Sit: Unable to assess  Sit to Supine: Unable to assess  Bed Mobility Comments: in chair at start and end of session  Transfers  Sit to stand: Stand by assistance  Stand to sit: Stand by assistance  Transfer Comments: SBA for sit<>stand from recliner chair to RW to recliner chair with mild cues for hand placement. Functional mobility with RW 20 feet x2 and 10 feet with RW with SBA, slow steady gait with no overt LOB noted     Orientation  Overall Orientation Status: Within Functional Limits                  Education Given To: Patient; Family  Education Provided: Role of Therapy;Plan of Care;Transfer Training;Precautions; ADL Adaptive Strategies  Education Provided Comments: post hip precautions, cira hose for 2 weeks, ice therapy and importance of mobility         Goals  Short Term Goals  Time Frame for Short Term Goals: prior to d/c: all goals ongoing  Short Term Goal 1: toileting SUP  Short Term Goal 2: LB dressing with AE SUP  Short Term Goal 3: UB bathing. dressing SUP  Short Term Goal 4: fxl tx and mobility with RW household distances SUP  Short Term Goal 5: tolerate 5 min fxl standing task SUP  Patient Goals   Patient goals : return home       Therapy Time   Individual Concurrent Group Co-treatment   Time In 0930         Time Out 1025         Minutes 55                 Electronically signed by TAYLA [id number] on 1/31/2023 at 10:34 AM

## 2023-01-31 NOTE — PROGRESS NOTES
Patient discharged with belongings and durable medical equipment with family via wheelchair via [address] transportation to private residence. Prescriptions given to patient/family. IV D/Cd patient tolerated well, no complications.

## 2023-01-31 NOTE — PLAN OF CARE
Problem: Discharge Planning  Goal: Discharge to home or other facility with appropriate resources  1/31/2023 0740 by Nato López RN  Outcome: Progressing  Flowsheets  Taken 1/31/2023 0740 by Nato López RN  Discharge to home or other facility with appropriate resources:   Identify barriers to discharge with patient and caregiver   Identify discharge learning needs (meds, wound care, etc)   Arrange for needed discharge resources and transportation as appropriate  Taken 1/30/2023 2112 by Ace Joya RN  Discharge to home or other facility with appropriate resources:   Identify barriers to discharge with patient and caregiver   Arrange for needed discharge resources and transportation as appropriate   Identify discharge learning needs (meds, wound care, etc)   Arrange for interpreters to assist at discharge as needed   Refer to discharge planning if patient needs post-hospital services based on physician order or complex needs related to functional status, cognitive ability or social support system     Problem: Chronic Conditions and Co-morbidities  Goal: Patient's chronic conditions and co-morbidity symptoms are monitored and maintained or improved  1/31/2023 0740 by Nato López RN  Outcome: Progressing  Flowsheets  Taken 1/31/2023 0740 by Nato López RN  Care Plan - Patient's Chronic Conditions and Co-Morbidity Symptoms are Monitored and Maintained or Improved:   Monitor and assess patient's chronic conditions and comorbid symptoms for stability, deterioration, or improvement   Collaborate with multidisciplinary team to address chronic and comorbid conditions and prevent exacerbation or deterioration  Taken 1/30/2023 2112 by Ace Joya RN  Care Plan - Patient's Chronic Conditions and Co-Morbidity Symptoms are Monitored and Maintained or Improved:   Monitor and assess patient's chronic conditions and comorbid symptoms for stability, deterioration, or improvement   Collaborate with multidisciplinary team to address chronic and comorbid conditions and prevent exacerbation or deterioration     Problem: Neurosensory - Adult  Goal: Achieves stable or improved neurological status  1/31/2023 0740 by Nadira Grossman RN  Outcome: Progressing  Flowsheets  Taken 1/31/2023 0740 by Nadira Grossman RN  Achieves stable or improved neurological status:   Assess for and report changes in neurological status   Initiate measures to prevent increased intracranial pressure   Maintain blood pressure and fluid volume within ordered parameters to optimize cerebral perfusion and minimize risk of hemorrhage  Taken 1/30/2023 2112 by Vandana Bey RN  Achieves stable or improved neurological status:   Assess for and report changes in neurological status   Initiate measures to prevent increased intracranial pressure     Problem: Cardiovascular - Adult  Goal: Maintains optimal cardiac output and hemodynamic stability  1/31/2023 0740 by Nadira Grossman RN  Outcome: Progressing  Flowsheets (Taken 1/31/2023 0740)  Maintains optimal cardiac output and hemodynamic stability:   Monitor blood pressure and heart rate   Monitor urine output and notify Licensed Independent Practitioner for values outside of normal range   Assess for signs of decreased cardiac output   Administer fluid and/or volume expanders as ordered     Problem: Skin/Tissue Integrity - Adult  Goal: Skin integrity remains intact  1/31/2023 0740 by Nadira Grossman RN  Outcome: Progressing  Flowsheets  Taken 1/31/2023 0740 by Nadira Grossman RN  Skin Integrity Remains Intact:   Assess vascular access sites hourly   Monitor for areas of redness and/or skin breakdown  Taken 1/30/2023 2113 by Vandana Bey RN  Skin Integrity Remains Intact:   Monitor for areas of redness and/or skin breakdown   Assess vascular access sites hourly  Taken 1/30/2023 2112 by Vandana Bey RN  Skin Integrity Remains Intact:   Monitor for areas of redness and/or skin breakdown   Assess vascular access sites hourly     Problem: Musculoskeletal - Adult  Goal: Return mobility to safest level of function  1/31/2023 0740 by Mahsa Garcia RN  Outcome: Progressing  Flowsheets  Taken 1/31/2023 0740 by Mahsa Garcia RN  Return Mobility to Safest Level of Function:   Assess patient stability and activity tolerance for standing, transferring and ambulating with or without assistive devices   Assist with transfers and ambulation using safe patient handling equipment as needed   Ensure adequate protection for wounds/incisions during mobilization  Taken 1/30/2023 2112 by Lisa Sigala RN  Return Mobility to Safest Level of Function:   Assess patient stability and activity tolerance for standing, transferring and ambulating with or without assistive devices   Assist with transfers and ambulation using safe patient handling equipment as needed   Ensure adequate protection for wounds/incisions during mobilization     Problem: Infection - Adult  Goal: Absence of infection at discharge  1/31/2023 0740 by Mahsa Garcia RN  Outcome: Progressing  Flowsheets  Taken 1/31/2023 0740 by Mahsa Garcia RN  Absence of infection at discharge:   Assess and monitor for signs and symptoms of infection   Monitor lab/diagnostic results   Monitor all insertion sites i.e., indwelling lines, tubes and drains  Taken 1/30/2023 2113 by Lisa Sigala RN  Absence of infection at discharge: Assess and monitor for signs and symptoms of infection  Taken 1/30/2023 2112 by Lisa Sigala RN  Absence of infection at discharge:   Assess and monitor for signs and symptoms of infection   Monitor lab/diagnostic results   Monitor all insertion sites i.e., indwelling lines, tubes and drains     Problem: Metabolic/Fluid and Electrolytes - Adult  Goal: Glucose maintained within prescribed range  1/31/2023 0740 by Mahsa Garcia RN  Outcome: Progressing  Flowsheets  Taken 1/31/2023 0740 by Mahsa Garcia RN  Glucose maintained within prescribed range:   Monitor blood glucose as ordered   Assess for signs and symptoms of hyperglycemia and hypoglycemia   Administer ordered medications to maintain glucose within target range  Taken 1/30/2023 2112 by Eladio Olivares RN  Glucose maintained within prescribed range:   Monitor blood glucose as ordered   Assess for signs and symptoms of hyperglycemia and hypoglycemia     Problem: Safety - Adult  Goal: Free from fall injury  1/31/2023 0740 by Ruth Lucero RN  Outcome: Progressing  Flowsheets (Taken 1/31/2023 0740)  Free From Fall Injury: Instruct family/caregiver on patient safety

## 2023-01-31 NOTE — PLAN OF CARE
Problem: Pain  Goal: Verbalizes/displays adequate comfort level or baseline comfort level  1/31/2023 0611 by Karol Berrios RN  Outcome: Progressing  1/30/2023 1945 by Mary Stahl RN  Outcome: Progressing  Flowsheets (Taken 1/30/2023 1743)  Verbalizes/displays adequate comfort level or baseline comfort level:   Encourage patient to monitor pain and request assistance   Assess pain using appropriate pain scale   Administer analgesics based on type and severity of pain and evaluate response   Implement non-pharmacological measures as appropriate and evaluate response

## 2023-01-31 NOTE — PLAN OF CARE
Problem: Discharge Planning  Goal: Discharge to home or other facility with appropriate resources  Outcome: Progressing  Flowsheets (Taken 1/30/2023 1600)  Discharge to home or other facility with appropriate resources:   Identify barriers to discharge with patient and caregiver   Arrange for needed discharge resources and transportation as appropriate   Identify discharge learning needs (meds, wound care, etc)   Arrange for interpreters to assist at discharge as needed   Refer to discharge planning if patient needs post-hospital services based on physician order or complex needs related to functional status, cognitive ability or social support system     Problem: Chronic Conditions and Co-morbidities  Goal: Patient's chronic conditions and co-morbidity symptoms are monitored and maintained or improved  Outcome: Progressing  Flowsheets (Taken 1/30/2023 1600)  Care Plan - Patient's Chronic Conditions and Co-Morbidity Symptoms are Monitored and Maintained or Improved:   Monitor and assess patient's chronic conditions and comorbid symptoms for stability, deterioration, or improvement   Collaborate with multidisciplinary team to address chronic and comorbid conditions and prevent exacerbation or deterioration     Problem: Neurosensory - Adult  Goal: Achieves stable or improved neurological status  Outcome: Progressing  Flowsheets (Taken 1/30/2023 1600)  Achieves stable or improved neurological status:   Assess for and report changes in neurological status   Initiate measures to prevent increased intracranial pressure   Maintain blood pressure and fluid volume within ordered parameters to optimize cerebral perfusion and minimize risk of hemorrhage   Monitor temperature, glucose, and sodium.  Initiate appropriate interventions as ordered     Problem: Cardiovascular - Adult  Goal: Maintains optimal cardiac output and hemodynamic stability  Outcome: Progressing     Problem: Skin/Tissue Integrity - Adult  Goal: Skin integrity remains intact  Outcome: Progressing  Flowsheets (Taken 1/30/2023 1600)  Skin Integrity Remains Intact: Monitor for areas of redness and/or skin breakdown  Goal: Incisions, wounds, or drain sites healing without S/S of infection  Outcome: Progressing  Flowsheets (Taken 1/30/2023 1600)  Incisions, Wounds, or Drain Sites Healing Without Sign and Symptoms of Infection:   ADMISSION and DAILY: Assess and document risk factors for pressure ulcer development   Implement wound care per orders  Goal: Oral mucous membranes remain intact  Outcome: Progressing  Flowsheets (Taken 1/30/2023 1600)  Oral Mucous Membranes Remain Intact:   Assess oral mucosa and hygiene practices   Implement preventative oral hygiene regimen   Implement oral medicated treatments as ordered     Problem: Musculoskeletal - Adult  Goal: Return mobility to safest level of function  Outcome: Progressing  Flowsheets (Taken 1/30/2023 1600)  Return Mobility to Safest Level of Function:   Assess patient stability and activity tolerance for standing, transferring and ambulating with or without assistive devices   Assist with transfers and ambulation using safe patient handling equipment as needed   Ensure adequate protection for wounds/incisions during mobilization   Obtain physical therapy/occupational therapy consults as needed  Goal: Maintain proper alignment of affected body part  Outcome: Progressing  Flowsheets (Taken 1/30/2023 1600)  Maintain proper alignment of affected body part: Support and protect limb and body alignment per provider's orders  Goal: Return ADL status to a safe level of function  Outcome: Progressing  Flowsheets (Taken 1/30/2023 1600)  Return ADL Status to a Safe Level of Function:   Administer medication as ordered   Assess activities of daily living deficits and provide assistive devices as needed   Obtain physical therapy/occupational therapy consults as needed   Assist and instruct patient to increase activity and self care as tolerated     Problem: Infection - Adult  Goal: Absence of infection at discharge  Outcome: Progressing  Flowsheets (Taken 1/30/2023 1600)  Absence of infection at discharge:   Assess and monitor for signs and symptoms of infection   Monitor lab/diagnostic results   Monitor all insertion sites i.e., indwelling lines, tubes and drains  Goal: Absence of infection during hospitalization  Outcome: Progressing  Flowsheets (Taken 1/30/2023 1600)  Absence of infection during hospitalization:   Assess and monitor for signs and symptoms of infection   Monitor lab/diagnostic results   Monitor all insertion sites i.e., indwelling lines, tubes and drains     Problem: Metabolic/Fluid and Electrolytes - Adult  Goal: Glucose maintained within prescribed range  Outcome: Progressing  Flowsheets (Taken 1/30/2023 1600)  Glucose maintained within prescribed range:   Monitor blood glucose as ordered   Assess for signs and symptoms of hyperglycemia and hypoglycemia   Administer ordered medications to maintain glucose within target range   Assess barriers to adequate nutritional intake and initiate nutrition consult as needed   Instruct patient on self management of diabetes and initiate consult as needed     Problem: Pain  Goal: Verbalizes/displays adequate comfort level or baseline comfort level  Outcome: Progressing  Flowsheets (Taken 1/30/2023 1743)  Verbalizes/displays adequate comfort level or baseline comfort level:   Encourage patient to monitor pain and request assistance   Assess pain using appropriate pain scale   Administer analgesics based on type and severity of pain and evaluate response   Implement non-pharmacological measures as appropriate and evaluate response     Problem: Safety - Adult  Goal: Free from fall injury  Outcome: Progressing     Problem: ABCDS Injury Assessment  Goal: Absence of physical injury  Outcome: Progressing

## 2023-01-31 NOTE — CARE COORDINATION
[facility]    DC order noted, all docs needed have been faxed to [facility] for home care services.     Home care to see patient within 24-48 hrs    Wellington Sanford RN, BSN CTN  [facility] [phone]

## 2023-01-31 NOTE — PROGRESS NOTES
Data- Discharge order received, patient verbalized agreement to discharge, disposition to previous residence, needs noted for [address] [employer] and [address] [place] [address] and informed Warren Andersen NP. Action- discharge instructions prepared and given to patient, patient verbalized understanding. Medication information packet given related to NEW and/or CHANGED prescriptions emphasizing name/purpose/side effects, pt verbalized understanding. Discharge instruction summary: Diet- General, Activity- Full weight bearing, Primary Care Physician as follows: Shabbir German [phone]. Follow up appointment with orthopedic office noted below, immunizations reviewed and discussed with patient, prescription medications to be filled by patient's pharmacy. Inpatient surgical procedure precautions reviewed: Posterior Hip  Precautions. Educated patient on using incentive spirometer post-discharge per surgeon's instructions. Neurovascular checks performed and moderate dorsi and plantar flexions noted bilaterally, toes appear appropriate to ethnicity in color, Warm to touch, patient denies numbness or tingling in extremities. Left Hip Incision site prineo glue dressing assessed and is clean,dry, and intact, no signs of redness, drainage, or odor noted. Bruising and edema noted and ice packs in place with barrier. patient's bedside RN christina notified of patient completing discharge instructions. Nurse Navigator and Orthopedic Office contact information on discharge instructions and provided to patient. Response-  Prescriptions already sent to patient's pharmacy and patient made aware he will need to pickup at discharge. Disposition is home with friend Brandon Betancourt and [address]. Durable Medical Equipment to be delivered to patient by Teddy Edwards. Patient to be transported by cassie Desai .     Future Appointments   Date Time Provider Rene Luu   2/14/2023  9:30 AM MD Parish De Electronically signed by Nan Mary RN on 1/31/2023 at 9:22 AM

## 2023-02-01 ENCOUNTER — TELEPHONE (OUTPATIENT)
Dept: ORTHOPEDIC SURGERY | Age: 68
End: 2023-02-01

## 2023-02-01 ENCOUNTER — CARE COORDINATION (OUTPATIENT)
Dept: CASE MANAGEMENT | Age: 68
End: 2023-02-01

## 2023-02-01 NOTE — TELEPHONE ENCOUNTER
Medical Facility Question     Facility Name: [facility]  Contact Name: LEVI  Contact Number: [phone]  Request or Information: NEEDING VERBAL ORDERS FOR PT AND OT

## 2023-02-01 NOTE — TELEPHONE ENCOUNTER
Prescription Refill     Medication Name:  NOT ELIQUIS IT COSTS HIM OVER $500.00.   Pharmacy: [address] [place] [address]  Patient Contact Number:  [phone]

## 2023-02-01 NOTE — CARE COORDINATION
Initial attempt at [facility] discharge phone call. Spoke briefly with Jayna Correa. Discussed [id number] Jayna Correa states he received a copy of his discharge instructions - he states they were reviewed with him prior to his discharge. Jayna Correa states the therapist is there with him now. He requests a call back. CT team will follow.     Ulises Peng RN BSN  Care Transition Nurse  [phone]

## 2023-02-02 ENCOUNTER — CARE COORDINATION (OUTPATIENT)
Dept: CASE MANAGEMENT | Age: 68
End: 2023-02-02

## 2023-02-02 NOTE — CARE COORDINATION
2nd attempt at [facility] discharge phone call. Unable to reach patient. Will hand off to Jimena CARBONE CTN for additional [employer].     Ishan Andujar RN BSN  Care Transition Nurse  [phone]

## 2023-02-03 ENCOUNTER — CARE COORDINATION (OUTPATIENT)
Dept: CASE MANAGEMENT | Age: 68
End: 2023-02-03

## 2023-02-03 NOTE — CARE COORDINATION
Called patient for updates. Left message for return call. Called Kristina/EC for patient updates. Left message for return call.   Adore FOLEYN  Care Transition Nurse for ortho bundle  [phone]

## 2023-02-06 ENCOUNTER — CARE COORDINATION (OUTPATIENT)
Dept: CASE MANAGEMENT | Age: 68
End: 2023-02-06

## 2023-02-06 NOTE — CARE COORDINATION
Spoke with patient. Incision status: States dressing dry and intact with no drainage. Edema/Swelling: States swelling in thigh and continues to ice. Pain level and status: States pain is tolerable. Use of pain medications: States taking tylenol for pain relief. Bowels: No complaints. Home Care Agency active: Continues with therapy. Do you have all of your medications: Yes, states taking baby aspirin for blood thinning instead of eliquis per physician instructions.     Changes in medications: No    Follow up appointments:    Future Appointments   Date Time Provider Rene Bell   2/14/2023  9:30 AM González Samuel, MD [facility] [address] & Margarette Gamez BSN  Care Transition Nurse for ortho bundle  [phone]
Resulted

## 2023-02-13 ENCOUNTER — CARE COORDINATION (OUTPATIENT)
Dept: CASE MANAGEMENT | Age: 68
End: 2023-02-13

## 2023-02-13 NOTE — CARE COORDINATION
Spoke with patient. Incision status: States dressing dry and intact with no drainage. Edema/Swelling: States swelling in thigh has improved. Pain level and status: States pain is tolerable. Use of pain medications: States has not taken any tylenol today. Bowels: No complaints. Home Care Agency active: Continues with therapy.     Do you have all of your medications: Yes    Changes in medications: No    Follow up appointments:    Future Appointments   Date Time Provider Rene Luu   2/14/2023  9:30 AM Yuli Alonso MD [facility] [address] Margarette Gamez BSN  Care Transition Nurse for ortho bundle  [phone]

## 2023-02-14 ENCOUNTER — OFFICE VISIT (OUTPATIENT)
Dept: ORTHOPEDIC SURGERY | Age: 68
End: 2023-02-14

## 2023-02-14 VITALS — HEIGHT: 66 IN | BODY MASS INDEX: 49.39 KG/M2

## 2023-02-14 DIAGNOSIS — Z96.642 STATUS POST TOTAL REPLACEMENT OF LEFT HIP: Primary | ICD-10-CM

## 2023-02-14 NOTE — PROGRESS NOTES
Giovanny Faust  [id number]  February 14, 2023    Chief Complaint   Patient presents with    Post-Op Check     1/30/23 L SORAYA             History: The patient is a 76-year-old gentleman who is here in follow-up regarding his left hip. He is now 2 weeks status post left total hip arthroplasty. He did get admitted to [facility] for observation with a bronchitis. He is feeling much better. He reports minimal to no pain in the left hip. The patient's  past medical history, medications, allergies,  family history, social history, and review of systems have been reviewed, and dated and are recorded in the chart. Ht 5' 6\" (1.676 m)   BMI 49.39 kg/m²     Physical: Mr. Giovanny Faust appears well, he is in no apparent distress, he demonstrates appropriate mood & affect. He is alert and oriented to person, place and time. He has mild swelling. There is No evidence of DVT seen on physical exam.. He is neurovascularly intact distally. The incision is  clean, dry, and intact and without erythema. Range of motion is: 40 degrees abduction, 90 degrees flexion, 35 degrees internal rotation and 35 degrees external rotation. He has  mild pain with range of motion of the hip. Leg length discrepancy:none    X-Rays: AP pelvis and 2 views of the left hip were obtained and reviewed. The prosthesis is well aligned. There is no evidence of loosening or subsidence. Impression: status post left Total Hip Arthroplasty,   Doing well postoperatively. Plan: At this time, the patient will continue physical therapy. The patient will continue posterior hip precautions. Follow up will be in 4 week(s).         Orders Placed This Encounter   Procedures    XR HIP 2-3 VW W PELVIS LEFT     Standing Status:   Future     Number of Occurrences:   1     Standing Expiration Date:   2/14/2024     Scheduling Instructions:      Rm 22     Order Specific Question:   Reason for exam:     Answer:   pain

## 2023-02-20 ENCOUNTER — CARE COORDINATION (OUTPATIENT)
Dept: CASE MANAGEMENT | Age: 68
End: 2023-02-20

## 2023-02-20 NOTE — CARE COORDINATION
Called patient for updates. Left message for return call.   Adore FOLEYN  Care Transition Nurse for ortho bundle  [phone]

## 2023-02-28 ENCOUNTER — CARE COORDINATION (OUTPATIENT)
Dept: CASE MANAGEMENT | Age: 68
End: 2023-02-28

## 2023-02-28 NOTE — CARE COORDINATION
Called patient for follow up with  [facility]. Left message for return call.   Ani FOLEYN  Care Transition Nurse for ortho bundle  [phone]

## 2023-03-08 ENCOUNTER — CARE COORDINATION (OUTPATIENT)
Dept: CASE MANAGEMENT | Age: 68
End: 2023-03-08

## 2023-03-08 NOTE — CARE COORDINATION
Called patient for follow up with  [facility]. Left message for return call.   Margaret Monroy BSN  Care Transition Nurse for ortho bundle  [phone]

## 2023-03-15 ENCOUNTER — CARE COORDINATION (OUTPATIENT)
Dept: CASE MANAGEMENT | Age: 68
End: 2023-03-15

## 2023-03-15 ENCOUNTER — OFFICE VISIT (OUTPATIENT)
Dept: ORTHOPEDIC SURGERY | Age: 68
End: 2023-03-15

## 2023-03-15 VITALS — BODY MASS INDEX: 49.39 KG/M2 | HEIGHT: 66 IN

## 2023-03-15 DIAGNOSIS — Z96.642 STATUS POST TOTAL REPLACEMENT OF LEFT HIP: Primary | ICD-10-CM

## 2023-03-15 PROCEDURE — 99024 POSTOP FOLLOW-UP VISIT: CPT | Performed by: ORTHOPAEDIC SURGERY

## 2023-03-15 NOTE — CARE COORDINATION
[facility]. Left message for return call.   Damion John BSN  Care Transition Nurse for ortho bundle  [phone]

## 2023-03-15 NOTE — PROGRESS NOTES
Derian Tabor  [id number]  March 15, 2023    Chief Complaint   Patient presents with    Post-Op Check     1/30/23 L SORAYA             History: The patient is a 44-year-old gentleman who is here in follow-up regarding his left hip. He is now 6 weeks status post left total hip arthroplasty. He is having no pain. He is ambulating without assistive devices. The patient's  past medical history, medications, allergies,  family history, social history, and review of systems have been reviewed, and dated and are recorded in the chart. Ht 5' 6\" (1.676 m)   BMI 49.39 kg/m²     Physical: Mr. Derian Tabor appears well, he is in no apparent distress, he demonstrates appropriate mood & affect. He is alert and oriented to person, place and time. He has mild swelling. There is No evidence of DVT seen on physical exam. He is neurovascularly intact distally. The incision is clean, dry, and intact and without erythema. Range of motion is: 40 degrees abduction, 95 degrees flexion, 35 degrees internal rotation and 35 degrees external rotation. He has mild pain with range of motion of the hip. Leg length discrepancy:none. X-Rays: AP pelvis and 2 views of the left hip obtained at last visit were reviewed. The prosthesis is well aligned. There is no evidence of loosening or subsidence. Impression: status post left Total Hip Arthroplasty,   Doing well postoperatively. Plan: At this time, the patient will continue his home exercise program.  He will work on his balance, gait and general strengthening. He will continue to work on weight loss. He will monitor his right hip for any increasing pain. He does have moderate to severe arthritis of the right hip. He will follow-up with me in approximately 6 to 7 weeks and we will reassess him then. At follow-up, an AP pelvis and 2 views of the left hip will be obtained. No orders of the defined types were placed in this encounter.

## 2023-03-22 ENCOUNTER — CARE COORDINATION (OUTPATIENT)
Dept: CASE MANAGEMENT | Age: 68
End: 2023-03-22

## 2023-03-22 NOTE — CARE COORDINATION
Called patient for follow up with  [facility]. Left message for return call.   Macy FOLEYN  Care Transition Nurse for ortho bundle  [phone]

## 2023-04-05 ENCOUNTER — CARE COORDINATION (OUTPATIENT)
Dept: CASE MANAGEMENT | Age: 68
End: 2023-04-05

## 2023-04-05 NOTE — CARE COORDINATION
Called patient for follow up with  [facility]. Left message for return call.   Ritesh FOLEYN  Care Transition Nurse for ortho bundle  [phone]

## 2023-04-17 ENCOUNTER — TELEPHONE (OUTPATIENT)
Dept: ORTHOPEDIC SURGERY | Age: 68
End: 2023-04-17

## 2023-04-17 NOTE — TELEPHONE ENCOUNTER
Prescription Refill     Medication Name:  [address]: [facility]/pharmacy [address] [phone] Joana English [phone]  Patient Contact Number:  [phone]

## 2023-04-18 RX ORDER — AMOXICILLIN 500 MG/1
500 TABLET, FILM COATED ORAL SEE ADMIN INSTRUCTIONS
Qty: 4 TABLET | Refills: 0 | Status: SHIPPED | OUTPATIENT
Start: 2023-04-18

## 2023-04-20 ENCOUNTER — CARE COORDINATION (OUTPATIENT)
Dept: CASE MANAGEMENT | Age: 68
End: 2023-04-20

## 2023-04-20 NOTE — CARE COORDINATION
Called patient for follow up with  [facility]. Left message that bundle program and follow up phone calls are ending. Left message to follow up with Dr. Cabrera Ahmadi for any complications/concerns.   Adrienne Combs BSN  Care Transition Nurse for ortho bundle  [phone]

## 2023-04-26 ENCOUNTER — OFFICE VISIT (OUTPATIENT)
Dept: ORTHOPEDIC SURGERY | Age: 68
End: 2023-04-26

## 2023-04-26 VITALS — WEIGHT: 310 LBS | HEIGHT: 66 IN | BODY MASS INDEX: 49.82 KG/M2

## 2023-04-26 DIAGNOSIS — Z96.642 STATUS POST TOTAL REPLACEMENT OF LEFT HIP: Primary | ICD-10-CM

## 2023-04-26 PROCEDURE — 99024 POSTOP FOLLOW-UP VISIT: CPT | Performed by: ORTHOPAEDIC SURGERY

## 2023-04-26 NOTE — PROGRESS NOTES
Judith Jackson  [id number]  April 26, 2023    Chief Complaint   Patient presents with    Post-Op Check     Left SORAYA; DOS 1/30/23. History: The patient is a 54-year-old gentleman who is now approximately 3 months status post left total hip arthroplasty. He is having no pain. He is doing extremely well. He is ambulating without assistive devices. He has resumed most daily activities. He has had some chronic pain in his back and continues to have low back pain. He has no radiating pain down his legs. He did drive up to [facility] office few days ago and feels this aggravated his back. He does periodically take Zanaflex. The patient's  past medical history, medications, allergies,  family history, social history, and review of systems have been reviewed, and dated and are recorded in the chart. Ht 5' 6\" (1.676 m)   Wt (!) 310 lb (140.6 kg)   BMI 50.04 kg/m²     Physical: Mr. Judith Jackson appears well, he is in no apparent distress, he demonstrates appropriate mood & affect. He is alert and oriented to person, place and time. He has mild swelling. There is No evidence of DVT seen on physical exam.. He is neurovascularly intact distally. The incision is clean, dry, and intact and without erythema. Range of motion is: 40 degrees abduction, 95 degrees flexion, 35 degrees internal rotation and 35 degrees external rotation. He has no pain with range of motion of the hip. Leg length discrepancy:none. The patient does have diffuse jackie lumbar muscle tightness and tenderness. X-Rays: AP pelvis and 2 views of the left hip were obtained and reviewed. The prosthesis is well aligned. There is no evidence of loosening or subsidence. Impression: status post left Total Hip Arthroplasty,   Doing well postoperatively. Plan: At this time, the patient will continue physical therapy. The patient may continue with regular activities. He was encouraged to continue to work on his balance and gait.  Follow

## 2023-07-26 ENCOUNTER — OFFICE VISIT (OUTPATIENT)
Dept: ORTHOPEDIC SURGERY | Age: 68
End: 2023-07-26

## 2023-07-26 VITALS — HEIGHT: 66 IN | WEIGHT: 315 LBS | BODY MASS INDEX: 50.62 KG/M2

## 2023-07-26 DIAGNOSIS — Z96.642 STATUS POST TOTAL REPLACEMENT OF LEFT HIP: Primary | ICD-10-CM

## 2024-01-09 RX ORDER — AMOXICILLIN 500 MG/1
TABLET, FILM COATED ORAL
Qty: 4 TABLET | Refills: 0 | Status: SHIPPED | OUTPATIENT
Start: 2024-01-09

## 2024-01-30 ENCOUNTER — OFFICE VISIT (OUTPATIENT)
Dept: ORTHOPEDIC SURGERY | Age: 69
End: 2024-01-30
Payer: MEDICARE

## 2024-01-30 VITALS — WEIGHT: 315 LBS | HEIGHT: 66 IN | BODY MASS INDEX: 50.62 KG/M2

## 2024-01-30 DIAGNOSIS — Z96.642 STATUS POST TOTAL REPLACEMENT OF LEFT HIP: Primary | ICD-10-CM

## 2024-01-30 PROCEDURE — G8427 DOCREV CUR MEDS BY ELIG CLIN: HCPCS | Performed by: ORTHOPAEDIC SURGERY

## 2024-01-30 PROCEDURE — 99213 OFFICE O/P EST LOW 20 MIN: CPT | Performed by: ORTHOPAEDIC SURGERY

## 2024-01-30 PROCEDURE — G8417 CALC BMI ABV UP PARAM F/U: HCPCS | Performed by: ORTHOPAEDIC SURGERY

## 2024-01-30 PROCEDURE — 3017F COLORECTAL CA SCREEN DOC REV: CPT | Performed by: ORTHOPAEDIC SURGERY

## 2024-01-30 PROCEDURE — 1124F ACP DISCUSS-NO DSCNMKR DOCD: CPT | Performed by: ORTHOPAEDIC SURGERY

## 2024-01-30 PROCEDURE — G8484 FLU IMMUNIZE NO ADMIN: HCPCS | Performed by: ORTHOPAEDIC SURGERY

## 2024-01-30 PROCEDURE — 1036F TOBACCO NON-USER: CPT | Performed by: ORTHOPAEDIC SURGERY

## 2024-01-30 NOTE — PROGRESS NOTES
Tico Malone  [id number]  January 30, 2024    Chief Complaint   Patient presents with    Follow-up     Left hip. S/P SORAYA; DOS 1/30/23.             History: The patient is a 67-year-old gentleman who is now approximately 1 year status post left total hip arthroplasty.  He is having no pain.  He is doing extremely well.  He is ambulating without assistive devices.  He has resumed most daily activities.  He is not having any right hip pain.  He is no longer having back pain.    The patient's  past medical history, medications, allergies,  family history, social history, and review of systems have been reviewed, and dated and are recorded in the chart.    Ht 1.676 m (5' 6\")   Wt (!) 142.9 kg (315 lb)   BMI 50.84 kg/m²     Physical: Mr. Tico Malone appears well, he is in no apparent distress, he demonstrates appropriate mood & affect. He is alert and oriented to person, place and time. He has mild swelling. There is No evidence of DVT seen on physical exam.. He is neurovascularly intact distally. The incision is clean, dry, and intact and without erythema. Range of motion is: 40 degrees abduction, 95 degrees flexion, 35 degrees internal rotation and 35 degrees external rotation. He has no pain with range of motion of the hip.  Leg length discrepancy:none. The patient does have mild diffuse jackie lumbar muscle tightness and tenderness.    X-Rays: AP pelvis and 2 views of the left hip were obtained and reviewed. The prosthesis is well aligned. There is no evidence of loosening or subsidence.  The patient does have moderate right hip osteoarthritis as well noted on these x-rays.    Impression: status post left Total Hip Arthroplasty,   Doing well postoperatively.      Plan: At this time, the patient will continue home exercises.  He will work on his balance and gait.  He will continue to monitor his right hip symptoms.  The patient can follow-up with me in 1 year and we will reassess him then.  At follow-up, an AP pelvis

## (undated) DEVICE — SOLUTION IV IRRIG POUR BRL 0.9% SODIUM CHL 2F7124

## (undated) DEVICE — PAD N ADH W3XL4IN POLY COT SFT PERF FLM EASILY CUT ABSRB

## (undated) DEVICE — PAD,ABDOMINAL,8"X7.5",STERILE,LF,1/PK: Brand: MEDLINE

## (undated) DEVICE — GOWN SIRUS NONREIN XL W/TWL: Brand: MEDLINE INDUSTRIES, INC.

## (undated) DEVICE — 3M™ IOBAN™ 2 ANTIMICROBIAL INCISE DRAPE 6650EZ: Brand: IOBAN™ 2

## (undated) DEVICE — GLOVE SURG SZ 85 L12IN FNGR THK94MIL STD WHT LTX FREE

## (undated) DEVICE — PILLOW POS W15XH6XL22IN RASPBERRY FOAM ABD W/ STRP DISP FOR

## (undated) DEVICE — SPONGE GZ W4XL4IN COT 12 PLY TYP VII WVN C FLD DSGN STERILE

## (undated) DEVICE — SOLUTION IRRIG 3000ML 0.9% SOD CHL USP UROMATIC PLAS CONT

## (undated) DEVICE — NEEDLE HYPO 22GA L1 1/2IN PIVOTING SHLD FOR LUERLOCK SYR

## (undated) DEVICE — PAD,NON-ADHERENT,3X8,STERILE,LF,1/PK: Brand: MEDLINE

## (undated) DEVICE — 1010 S-DRAPE TOWEL DRAPE 10/BX: Brand: STERI-DRAPE™

## (undated) DEVICE — DRAPE,REIN 53X77,STERILE: Brand: MEDLINE

## (undated) DEVICE — SUTURE VCRL + SZ 1 L18IN ABSRB UD L36MM CT-1 1/2 CIR VCP841D

## (undated) DEVICE — ANTI-EMBOLISM STOCKINGS,THIGH LENGTH,LARGE-LONG-SIZE J: Brand: T.E.D.

## (undated) DEVICE — GLOVE ORANGE PI 8 1/2   MSG9085

## (undated) DEVICE — DRAPE,HIP,W/POUCHES,STERILE: Brand: MEDLINE

## (undated) DEVICE — SUTURE STRATAFIX SPRL SZ 3-0 L12IN ABSRB UD FS-1 L30X30CM SXMP2B410

## (undated) DEVICE — HANDPIECE SET WITH HIGH FLOW TIP AND SUCTION TUBE: Brand: INTERPULSE

## (undated) DEVICE — 3M™ STERI-DRAPE™ U-DRAPE 1015: Brand: STERI-DRAPE™

## (undated) DEVICE — ELECTRODE BLDE L6.5IN CAUT EXT DISP

## (undated) DEVICE — TOTAL HIP: Brand: MEDLINE INDUSTRIES, INC.

## (undated) DEVICE — SUTURE VCRL + SZ 2-0 L18IN ABSRB UD CT1 L36MM 1/2 CIR VCP839D

## (undated) DEVICE — CLEANER,CAUTERY TIP,2X2",STERILE: Brand: MEDLINE

## (undated) DEVICE — 450 ML BOTTLE OF 0.05% CHLORHEXIDINE GLUCONATE IN 99.95% STERILE WATER FOR IRRIGATION, USP AND APPLICATOR.: Brand: IRRISEPT ANTIMICROBIAL WOUND LAVAGE

## (undated) DEVICE — SUTURE VCRL + 1 L27IN ABSRB UD CT-1 L36MM 1/2 CIR TAPR PNT VCP261H

## (undated) DEVICE — GARMENT COMPR STD FOR 17IN CALF UNIF THER FLOTRN